# Patient Record
Sex: FEMALE | Race: ASIAN | NOT HISPANIC OR LATINO | Employment: FULL TIME | URBAN - METROPOLITAN AREA
[De-identification: names, ages, dates, MRNs, and addresses within clinical notes are randomized per-mention and may not be internally consistent; named-entity substitution may affect disease eponyms.]

---

## 2023-10-20 ENCOUNTER — TELEPHONE (OUTPATIENT)
Dept: PRIMARY CARE | Facility: CLINIC | Age: 63
End: 2023-10-20
Payer: COMMERCIAL

## 2023-10-20 DIAGNOSIS — Z00.00 WELLNESS EXAMINATION: Primary | ICD-10-CM

## 2023-12-04 ENCOUNTER — LAB (OUTPATIENT)
Dept: LAB | Facility: LAB | Age: 63
End: 2023-12-04
Payer: COMMERCIAL

## 2023-12-04 DIAGNOSIS — Z00.00 WELLNESS EXAMINATION: ICD-10-CM

## 2023-12-04 LAB
ALBUMIN SERPL BCP-MCNC: 4.7 G/DL (ref 3.4–5)
ALP SERPL-CCNC: 76 U/L (ref 33–136)
ALT SERPL W P-5'-P-CCNC: 19 U/L (ref 7–45)
ANION GAP SERPL CALC-SCNC: 14 MMOL/L (ref 10–20)
AST SERPL W P-5'-P-CCNC: 17 U/L (ref 9–39)
BASOPHILS # BLD AUTO: 0.07 X10*3/UL (ref 0–0.1)
BASOPHILS NFR BLD AUTO: 1.2 %
BILIRUB SERPL-MCNC: 0.5 MG/DL (ref 0–1.2)
BUN SERPL-MCNC: 13 MG/DL (ref 6–23)
CALCIUM SERPL-MCNC: 10 MG/DL (ref 8.6–10.6)
CHLORIDE SERPL-SCNC: 103 MMOL/L (ref 98–107)
CHOLEST SERPL-MCNC: 314 MG/DL (ref 0–199)
CHOLESTEROL/HDL RATIO: 5.4
CO2 SERPL-SCNC: 27 MMOL/L (ref 21–32)
CREAT SERPL-MCNC: 0.62 MG/DL (ref 0.5–1.05)
EOSINOPHIL # BLD AUTO: 0.15 X10*3/UL (ref 0–0.7)
EOSINOPHIL NFR BLD AUTO: 2.6 %
ERYTHROCYTE [DISTWIDTH] IN BLOOD BY AUTOMATED COUNT: 13.1 % (ref 11.5–14.5)
GFR SERPL CREATININE-BSD FRML MDRD: >90 ML/MIN/1.73M*2
GLUCOSE SERPL-MCNC: 95 MG/DL (ref 74–99)
HCT VFR BLD AUTO: 44.8 % (ref 36–46)
HDLC SERPL-MCNC: 57.9 MG/DL
HGB BLD-MCNC: 14.4 G/DL (ref 12–16)
IMM GRANULOCYTES # BLD AUTO: 0 X10*3/UL (ref 0–0.7)
IMM GRANULOCYTES NFR BLD AUTO: 0 % (ref 0–0.9)
LDLC SERPL CALC-MCNC: 203 MG/DL
LYMPHOCYTES # BLD AUTO: 1.82 X10*3/UL (ref 1.2–4.8)
LYMPHOCYTES NFR BLD AUTO: 31.1 %
MCH RBC QN AUTO: 28.9 PG (ref 26–34)
MCHC RBC AUTO-ENTMCNC: 32.1 G/DL (ref 32–36)
MCV RBC AUTO: 90 FL (ref 80–100)
MONOCYTES # BLD AUTO: 0.35 X10*3/UL (ref 0.1–1)
MONOCYTES NFR BLD AUTO: 6 %
NEUTROPHILS # BLD AUTO: 3.47 X10*3/UL (ref 1.2–7.7)
NEUTROPHILS NFR BLD AUTO: 59.1 %
NON HDL CHOLESTEROL: 256 MG/DL (ref 0–149)
NRBC BLD-RTO: 0 /100 WBCS (ref 0–0)
PLATELET # BLD AUTO: 236 X10*3/UL (ref 150–450)
POTASSIUM SERPL-SCNC: 4.2 MMOL/L (ref 3.5–5.3)
PROT SERPL-MCNC: 7.3 G/DL (ref 6.4–8.2)
RBC # BLD AUTO: 4.99 X10*6/UL (ref 4–5.2)
SODIUM SERPL-SCNC: 140 MMOL/L (ref 136–145)
TRIGL SERPL-MCNC: 268 MG/DL (ref 0–149)
TSH SERPL-ACNC: 1.57 MIU/L (ref 0.44–3.98)
VLDL: 54 MG/DL (ref 0–40)
WBC # BLD AUTO: 5.9 X10*3/UL (ref 4.4–11.3)

## 2023-12-04 PROCEDURE — 80053 COMPREHEN METABOLIC PANEL: CPT

## 2023-12-04 PROCEDURE — 36415 COLL VENOUS BLD VENIPUNCTURE: CPT

## 2023-12-04 PROCEDURE — 80061 LIPID PANEL: CPT

## 2023-12-04 PROCEDURE — 84443 ASSAY THYROID STIM HORMONE: CPT

## 2023-12-04 PROCEDURE — 85025 COMPLETE CBC W/AUTO DIFF WBC: CPT

## 2023-12-07 ENCOUNTER — OFFICE VISIT (OUTPATIENT)
Dept: PRIMARY CARE | Facility: CLINIC | Age: 63
End: 2023-12-07
Payer: COMMERCIAL

## 2023-12-07 VITALS
OXYGEN SATURATION: 95 % | HEIGHT: 67 IN | SYSTOLIC BLOOD PRESSURE: 130 MMHG | RESPIRATION RATE: 18 BRPM | WEIGHT: 181.8 LBS | TEMPERATURE: 98.2 F | DIASTOLIC BLOOD PRESSURE: 78 MMHG | BODY MASS INDEX: 28.53 KG/M2 | HEART RATE: 88 BPM

## 2023-12-07 DIAGNOSIS — Z00.00 WELLNESS EXAMINATION: Primary | ICD-10-CM

## 2023-12-07 DIAGNOSIS — E78.00 PURE HYPERCHOLESTEROLEMIA: ICD-10-CM

## 2023-12-07 DIAGNOSIS — Z12.11 ENCOUNTER FOR SCREENING COLONOSCOPY: ICD-10-CM

## 2023-12-07 DIAGNOSIS — Z12.31 SCREENING MAMMOGRAM FOR BREAST CANCER: ICD-10-CM

## 2023-12-07 PROCEDURE — 99396 PREV VISIT EST AGE 40-64: CPT | Performed by: FAMILY MEDICINE

## 2023-12-07 PROCEDURE — 1036F TOBACCO NON-USER: CPT | Performed by: FAMILY MEDICINE

## 2023-12-07 RX ORDER — MULTIVITAMIN
TABLET ORAL
COMMUNITY

## 2023-12-07 RX ORDER — ROSUVASTATIN CALCIUM 5 MG/1
5 TABLET, COATED ORAL DAILY
Qty: 90 TABLET | Refills: 0 | Status: SHIPPED | OUTPATIENT
Start: 2023-12-07 | End: 2023-12-19 | Stop reason: WASHOUT

## 2023-12-07 ASSESSMENT — ENCOUNTER SYMPTOMS
LOSS OF SENSATION IN FEET: 0
DEPRESSION: 0
OCCASIONAL FEELINGS OF UNSTEADINESS: 0

## 2023-12-07 ASSESSMENT — COLUMBIA-SUICIDE SEVERITY RATING SCALE - C-SSRS
1. IN THE PAST MONTH, HAVE YOU WISHED YOU WERE DEAD OR WISHED YOU COULD GO TO SLEEP AND NOT WAKE UP?: NO
6. HAVE YOU EVER DONE ANYTHING, STARTED TO DO ANYTHING, OR PREPARED TO DO ANYTHING TO END YOUR LIFE?: NO
2. HAVE YOU ACTUALLY HAD ANY THOUGHTS OF KILLING YOURSELF?: NO

## 2023-12-07 ASSESSMENT — PATIENT HEALTH QUESTIONNAIRE - PHQ9
SUM OF ALL RESPONSES TO PHQ9 QUESTIONS 1 AND 2: 0
1. LITTLE INTEREST OR PLEASURE IN DOING THINGS: NOT AT ALL
2. FEELING DOWN, DEPRESSED OR HOPELESS: NOT AT ALL

## 2023-12-07 NOTE — PROGRESS NOTES
"Subjective   Patient ID: Nargis Gill is a 63 y.o. female who presents for Annual Exam.    HPI   Pt lipids are up  Review of Systems   All other systems reviewed and are negative.  Pt UTD vaccines  Needs colonoscopy and mammogram      Objective   /78 (BP Location: Right arm, Patient Position: Sitting, BP Cuff Size: Adult)   Pulse 88   Temp 36.8 °C (98.2 °F)   Resp 18   Ht 1.702 m (5' 7\")   Wt 82.5 kg (181 lb 12.8 oz)   SpO2 95%   BMI 28.47 kg/m²     Physical Exam  Constitutional:       Appearance: Normal appearance.   HENT:      Head: Normocephalic and atraumatic.      Right Ear: Tympanic membrane, ear canal and external ear normal.      Left Ear: Tympanic membrane, ear canal and external ear normal.      Nose: Nose normal.      Mouth/Throat:      Mouth: Mucous membranes are moist.      Pharynx: Oropharynx is clear.   Eyes:      Extraocular Movements: Extraocular movements intact.      Conjunctiva/sclera: Conjunctivae normal.      Pupils: Pupils are equal, round, and reactive to light.   Cardiovascular:      Rate and Rhythm: Normal rate and regular rhythm.      Pulses: Normal pulses.      Heart sounds: Normal heart sounds.   Pulmonary:      Effort: Pulmonary effort is normal.      Breath sounds: Normal breath sounds.   Abdominal:      General: Abdomen is flat. Bowel sounds are normal.      Palpations: Abdomen is soft.   Genitourinary:     Comments: nl  Musculoskeletal:         General: Normal range of motion.      Cervical back: Normal range of motion and neck supple.   Skin:     General: Skin is warm and dry.      Capillary Refill: Capillary refill takes less than 2 seconds.   Neurological:      General: No focal deficit present.      Mental Status: She is alert and oriented to person, place, and time.   Psychiatric:         Mood and Affect: Mood normal.         Behavior: Behavior normal.         Thought Content: Thought content normal.         Assessment/Plan   Diagnoses and all orders for this " visit:  Wellness examination  Screening mammogram for breast cancer  -     BI mammo bilateral screening tomosynthesis; Future  Encounter for screening colonoscopy  -     Colonoscopy Screening; Average Risk Patient; Future  Pure hypercholesterolemia  -     CT cardiac scoring wo IV contrast; Future  -     Lipoprotein NMR; Future  -     Lipid Panel; Future  -     Comprehensive Metabolic Panel; Future  -     rosuvastatin (Crestor) 5 mg tablet; Take 1 tablet (5 mg) by mouth once daily.    Rto 3m

## 2023-12-08 DIAGNOSIS — Z12.11 COLON CANCER SCREENING: ICD-10-CM

## 2023-12-08 RX ORDER — POLYETHYLENE GLYCOL 3350, SODIUM SULFATE ANHYDROUS, SODIUM BICARBONATE, SODIUM CHLORIDE, POTASSIUM CHLORIDE 236; 22.74; 6.74; 5.86; 2.97 G/4L; G/4L; G/4L; G/4L; G/4L
4000 POWDER, FOR SOLUTION ORAL ONCE
Qty: 4000 ML | Refills: 0 | Status: SHIPPED | OUTPATIENT
Start: 2023-12-08 | End: 2023-12-08

## 2023-12-09 ENCOUNTER — ANCILLARY PROCEDURE (OUTPATIENT)
Dept: RADIOLOGY | Facility: CLINIC | Age: 63
End: 2023-12-09
Payer: COMMERCIAL

## 2023-12-09 DIAGNOSIS — E78.00 PURE HYPERCHOLESTEROLEMIA: ICD-10-CM

## 2023-12-09 PROCEDURE — 75571 CT HRT W/O DYE W/CA TEST: CPT

## 2023-12-14 ENCOUNTER — OFFICE VISIT (OUTPATIENT)
Dept: OBSTETRICS AND GYNECOLOGY | Facility: CLINIC | Age: 63
End: 2023-12-14
Payer: COMMERCIAL

## 2023-12-14 ENCOUNTER — APPOINTMENT (OUTPATIENT)
Dept: OBSTETRICS AND GYNECOLOGY | Facility: CLINIC | Age: 63
End: 2023-12-14
Payer: COMMERCIAL

## 2023-12-14 VITALS
SYSTOLIC BLOOD PRESSURE: 122 MMHG | DIASTOLIC BLOOD PRESSURE: 80 MMHG | HEIGHT: 66 IN | WEIGHT: 178 LBS | BODY MASS INDEX: 28.61 KG/M2

## 2023-12-14 DIAGNOSIS — Z78.0 ASYMPTOMATIC POSTMENOPAUSAL STATE: ICD-10-CM

## 2023-12-14 DIAGNOSIS — Z01.419 WELL WOMAN EXAM WITH ROUTINE GYNECOLOGICAL EXAM: ICD-10-CM

## 2023-12-14 DIAGNOSIS — Z12.4 ROUTINE CERVICAL SMEAR: ICD-10-CM

## 2023-12-14 DIAGNOSIS — Z13.820 SCREENING FOR OSTEOPOROSIS: Primary | ICD-10-CM

## 2023-12-14 PROCEDURE — 88141 CYTOPATH C/V INTERPRET: CPT | Performed by: PATHOLOGY

## 2023-12-14 PROCEDURE — 88175 CYTOPATH C/V AUTO FLUID REDO: CPT

## 2023-12-14 PROCEDURE — 1036F TOBACCO NON-USER: CPT | Performed by: OBSTETRICS & GYNECOLOGY

## 2023-12-14 PROCEDURE — 87624 HPV HI-RISK TYP POOLED RSLT: CPT

## 2023-12-14 PROCEDURE — 99396 PREV VISIT EST AGE 40-64: CPT | Performed by: OBSTETRICS & GYNECOLOGY

## 2023-12-14 NOTE — PROGRESS NOTES
Subjective   Patient ID: Nargis Gill is a 63 y.o. female who presents for Annual Exam.  PAP 12-12-19 NEG, 12-8-17 NEG HPV NEG, 6-18-15 NEG, 3-21-14 severe dysplasia/CIS, PAP 12-16-14 NEG HPV NEG  COLPO 6-19-14 ARTIS 2 to 3  LEEP 8-29-14 NEG  MAMMO 9-16-21 has tomorrow.  DEXA 9-16-21 No FH.  COLON 12-2-13 one small polyp, 10 years. Has scheduled for January 12.     Has 4 grandkids. Kids doing ER.8    Elevated cholesterol and calcium score. Seeing a cardiologist. Started on Crestor. Broke wrist this fall.     No VB. Weight gain.     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     Dad had tripple bypass. Had a tibial plateu fx this summer. Had a DEXA. Taking vit D.     Review of Systems   All other systems reviewed and are negative.      Objective   Constitutional: Alert and in no acute distress. Well developed, well nourished.  Neck: no neck asymmetry. Supple and thyroid not enlarged and there were no palpable thyroid nodules.  Chest: Breasts normal appearance, no nipple discharge and no skin changes and palpation of breasts and axillae: no palpable mass and no axillary lymphadenopathy.  Abdomen: soft nontender; no abdominal mass palpated, no organomegaly and no hernias.  Genitourinary: external genitalia: normal, no inguinal lymphadenopathy, Bartholin's urethral and Wood-Ridge's glands: normal, urethra: normal and bladder: normal on palpation.  Vagina: normal.  Cervix: normal.  Uterus: normal.   Right adnexa/parametria: normal.  Left adnexa/parametria: normal.  Skin: normal skin color and pigmentation, normal skin turgor and no rash.  Psychiatric: alert and oriented x 3, affect normal to patient baseline and mood appropriate.     Assessment/Plan   -Pap-HPV  -has saul order  -DEXA  -Has colonoscopy scheduled.

## 2023-12-15 ENCOUNTER — ANCILLARY PROCEDURE (OUTPATIENT)
Dept: RADIOLOGY | Facility: CLINIC | Age: 63
End: 2023-12-15
Payer: COMMERCIAL

## 2023-12-15 DIAGNOSIS — Z12.31 SCREENING MAMMOGRAM FOR BREAST CANCER: ICD-10-CM

## 2023-12-15 PROCEDURE — 77067 SCR MAMMO BI INCL CAD: CPT | Performed by: RADIOLOGY

## 2023-12-15 PROCEDURE — 77067 SCR MAMMO BI INCL CAD: CPT

## 2023-12-15 PROCEDURE — 77063 BREAST TOMOSYNTHESIS BI: CPT | Performed by: RADIOLOGY

## 2023-12-18 ENCOUNTER — ANCILLARY PROCEDURE (OUTPATIENT)
Dept: RADIOLOGY | Facility: CLINIC | Age: 63
End: 2023-12-18
Payer: COMMERCIAL

## 2023-12-18 DIAGNOSIS — Z78.0 ASYMPTOMATIC POSTMENOPAUSAL STATE: ICD-10-CM

## 2023-12-18 DIAGNOSIS — Z13.820 SCREENING FOR OSTEOPOROSIS: ICD-10-CM

## 2023-12-18 PROCEDURE — 77080 DXA BONE DENSITY AXIAL: CPT

## 2023-12-18 PROCEDURE — 77080 DXA BONE DENSITY AXIAL: CPT | Performed by: RADIOLOGY

## 2023-12-19 ENCOUNTER — OFFICE VISIT (OUTPATIENT)
Dept: CARDIOLOGY | Facility: CLINIC | Age: 63
End: 2023-12-19
Payer: COMMERCIAL

## 2023-12-19 VITALS
SYSTOLIC BLOOD PRESSURE: 153 MMHG | DIASTOLIC BLOOD PRESSURE: 97 MMHG | BODY MASS INDEX: 27.78 KG/M2 | HEIGHT: 67 IN | HEART RATE: 76 BPM | WEIGHT: 177 LBS | TEMPERATURE: 97.6 F

## 2023-12-19 DIAGNOSIS — E78.01 FAMILIAL HYPERCHOLESTEREMIA: ICD-10-CM

## 2023-12-19 DIAGNOSIS — R93.1 AGATSTON CORONARY ARTERY CALCIUM SCORE GREATER THAN 400: ICD-10-CM

## 2023-12-19 DIAGNOSIS — I25.10 CORONARY ARTERY DISEASE INVOLVING NATIVE CORONARY ARTERY OF NATIVE HEART WITHOUT ANGINA PECTORIS: Primary | ICD-10-CM

## 2023-12-19 PROCEDURE — 99214 OFFICE O/P EST MOD 30 MIN: CPT | Performed by: INTERNAL MEDICINE

## 2023-12-19 PROCEDURE — 99204 OFFICE O/P NEW MOD 45 MIN: CPT | Performed by: INTERNAL MEDICINE

## 2023-12-19 PROCEDURE — 1036F TOBACCO NON-USER: CPT | Performed by: INTERNAL MEDICINE

## 2023-12-19 RX ORDER — NAPROXEN SODIUM 220 MG/1
81 TABLET, FILM COATED ORAL DAILY
Qty: 30 TABLET | Refills: 11 | Status: SHIPPED | OUTPATIENT
Start: 2023-12-19 | End: 2024-12-18

## 2023-12-19 RX ORDER — UBIDECARENONE 30 MG
30 CAPSULE ORAL DAILY
COMMUNITY

## 2023-12-19 RX ORDER — ROSUVASTATIN CALCIUM 20 MG/1
20 TABLET, COATED ORAL DAILY
Qty: 30 TABLET | Refills: 11 | Status: SHIPPED | OUTPATIENT
Start: 2023-12-19 | End: 2024-12-18

## 2023-12-19 NOTE — PROGRESS NOTES
Chief Complaint:   Coronary Artery Disease     History of Present Illness     Nargis Gill is a 63 y.o. female presenting with for evaluation of preclinical coronary artery disease detected by coronary artery calcium scoring.   The patient follows a heart healthy diet.  The patient  is not having any anginal symptoms or dyspnea on exertion.  Hx of severely elevated LDLC.    Review of Systems  All pertinent systems have been reviewed and are negative except for what is stated in the history of present illness.    All other systems have been reviewed and are negative and noncontributory to this patient's current ailments.   .       Previous History     Past Medical History:  She has a past medical history of Agatston coronary artery calcium score greater than 400 (12/19/2023), Carcinoma in situ of cervix, unspecified, Coronary artery disease involving native coronary artery of native heart without angina pectoris (12/19/2023), Familial hypercholesteremia (12/19/2023), Moderate cervical dysplasia (12/16/2014), Other specified noninflammatory disorders of vagina, Personal history of other diseases of the circulatory system, Personal history of other diseases of the female genital tract, Personal history of other diseases of the female genital tract, and Personal history of other infectious and parasitic diseases.    Past Surgical History:  She has a past surgical history that includes Cataract extraction (03/25/2014); Other surgical history (10/14/2021); Other surgical history (10/14/2021); and Other surgical history (05/08/2014).      Social History:  She reports that she has never smoked. She has never used smokeless tobacco. She reports that she does not drink alcohol and does not use drugs.    Family History:  Family History   Problem Relation Name Age of Onset    Kidney cancer Mother      Other (ANXIETY) Father      Heart disease Father          Allergies:  Patient has no known allergies.    Outpatient  "Medications:  Current Outpatient Medications   Medication Instructions    co-enzyme Q-10 30 mg, oral, Daily    multivitamin tablet oral    rosuvastatin (CRESTOR) 5 mg, oral, Daily       Physical Examination   Vitals:  Visit Vitals  BP (!) 153/97 (BP Location: Right arm)   Pulse 76   Temp 36.4 °C (97.6 °F)   Ht 1.702 m (5' 7\")   Wt 80.3 kg (177 lb)   LMP  (LMP Unknown)   BMI 27.72 kg/m²   OB Status Postmenopausal   Smoking Status Never   BSA 1.95 m²    Physical Exam  Vitals reviewed.   Constitutional:       General: She is not in acute distress.     Appearance: Normal appearance.   HENT:      Head: Normocephalic and atraumatic.      Nose: Nose normal.   Eyes:      Conjunctiva/sclera: Conjunctivae normal.   Cardiovascular:      Rate and Rhythm: Normal rate and regular rhythm.      Pulses: Normal pulses.      Heart sounds: No murmur heard.  Pulmonary:      Effort: Pulmonary effort is normal. No respiratory distress.      Breath sounds: Normal breath sounds. No wheezing, rhonchi or rales.   Abdominal:      General: Bowel sounds are normal. There is no distension.      Palpations: Abdomen is soft.      Tenderness: There is no abdominal tenderness.   Musculoskeletal:         General: No swelling.      Right lower leg: No edema.      Left lower leg: No edema.   Skin:     General: Skin is warm and dry.      Capillary Refill: Capillary refill takes less than 2 seconds.   Neurological:      General: No focal deficit present.      Mental Status: She is alert.   Psychiatric:         Mood and Affect: Mood normal.              Labs/Imaging/Cardiac Studies     Last Labs:  CBC -  Lab Results   Component Value Date    WBC 5.9 12/04/2023    HGB 14.4 12/04/2023    HCT 44.8 12/04/2023    MCV 90 12/04/2023     12/04/2023       CMP -  Lab Results   Component Value Date    CALCIUM 10.0 12/04/2023    PROT 7.3 12/04/2023    ALBUMIN 4.7 12/04/2023    AST 17 12/04/2023    ALT 19 12/04/2023    ALKPHOS 76 12/04/2023    BILITOT 0.5 " 12/04/2023       LIPID PANEL -   Lab Results   Component Value Date    CHOL 314 (H) 12/04/2023    HDL 57.9 12/04/2023    CHHDL 5.4 12/04/2023    VLDL 54 (H) 12/04/2023    TRIG 268 (H) 12/04/2023    NHDL 256 (H) 12/04/2023       RENAL FUNCTION PANEL -   Lab Results   Component Value Date    K 4.2 12/04/2023       Lab Results   Component Value Date    HGBA1C 6.0 (A) 10/30/2021       ECG:    Echo:  No echocardiogram results found for the past 12 months   Interpreted By:  Bridger Crockett,   STUDY:  CT CARDIAC SCORING WO IV CONTRAST;  12/9/2023 11:41 am      INDICATION:  Signs/Symptoms:hld.      COMPARISON:  None.      ACCESSION NUMBER(S):  [id number]      ORDERING CLINICIAN:  MALDONADO NICOLAS      TECHNIQUE:  Using prospective ECG gating, CT scan of the coronary arteries was  performed without intravenous contrast. Coronary calcium scoring  was  performed according to the method of Agatston.      CT Dose-Length Product (DLP): 65.1 mGy*cm  CT Dose Reduction Employed: Yes, prospective gating, iterative  reconstruction.      FINDINGS:  The score and distribution of calcium in the coronary arteries is as  follows:      LM 0    LCx 0        Total 721      The visualized ascending thoracic aorta measures 3.6 cm in diameter.  The heart is normal in size. No pericardial effusion is present.          The main pulmonary artery, right and left pulmonary artery are normal  in size.          IMPRESSION:  1. Coronary artery calcium score of 721*.  2. CHAO 98th percentile** for age, gender, and race in asymptomatic  patients.    Assessment and Recommendations     Assessment/Plan     1. Coronary artery disease involving native coronary artery of native heart without angina pectoris  CAD: The patient's CAD, as detailed in the HPI, has been clinically stable, without any anginal symptoms or dyspnea.  The patient will start treatment with guideline-directed medical therapy with antiplatelet and statin medications and will  start regular exercise and a heart healthy diet.     - Echocardiogram Stress Test; Future    2. Agatston coronary artery calcium score greater than 400  As above  - Echocardiogram Stress Test; Future    3. Familial hypercholesteremia  She has heterozygous FH with LDL>190 - will start Crestor 20 mg every day.   - Echocardiogram Stress Test; Future       Johny Cifuentes MD    Exclusive of any other services or procedures performed, IJohny MD , spent 30 minutes in duration for this visit today.  This time consisted of chart review, obtaining history, and/or performing the exam as documented above as well as documenting the clinical information for the encounter in the electronic record, discussing treatment options, plans, and/or goals with patient, family, and/or caregiver, refilling medications, updating the electronic record, ordering medicines, lab work, imaging, referrals, and/or procedures as documented above and communicating with other [facility] professionals. I have discussed the results of laboratory, radiology, and cardiology studies with the patient and their family/caregiver.

## 2023-12-20 ENCOUNTER — OFFICE VISIT (OUTPATIENT)
Dept: DERMATOLOGY | Facility: CLINIC | Age: 63
End: 2023-12-20
Payer: COMMERCIAL

## 2023-12-20 DIAGNOSIS — L40.9 PSORIASIS: Primary | ICD-10-CM

## 2023-12-20 PROCEDURE — 1036F TOBACCO NON-USER: CPT | Performed by: DERMATOLOGY

## 2023-12-20 PROCEDURE — 99204 OFFICE O/P NEW MOD 45 MIN: CPT | Performed by: DERMATOLOGY

## 2023-12-20 RX ORDER — CLOBETASOL PROPIONATE 0.5 MG/G
CREAM TOPICAL
Qty: 60 G | Refills: 3 | Status: SHIPPED | OUTPATIENT
Start: 2023-12-20 | End: 2024-02-28 | Stop reason: WASHOUT

## 2023-12-20 ASSESSMENT — DERMATOLOGY QUALITY OF LIFE (QOL) ASSESSMENT
RATE HOW BOTHERED YOU ARE BY SYMPTOMS OF YOUR SKIN PROBLEM (EG, ITCHING, STINGING BURNING, HURTING OR SKIN IRRITATION): 0 - NEVER BOTHERED
RATE HOW EMOTIONALLY BOTHERED YOU ARE BY YOUR SKIN PROBLEM (FOR EXAMPLE, WORRY, EMBARRASSMENT, FRUSTRATION): 1
RATE HOW BOTHERED YOU ARE BY EFFECTS OF YOUR SKIN PROBLEMS ON YOUR ACTIVITIES (EG, GOING OUT, ACCOMPLISHING WHAT YOU WANT, WORK ACTIVITIES OR YOUR RELATIONSHIPS WITH OTHERS): 1
DATE THE QUALITY-OF-LIFE ASSESSMENT WAS COMPLETED: 66828
WHAT SINGLE SKIN CONDITION LISTED BELOW IS THE PATIENT ANSWERING THE QUALITY-OF-LIFE ASSESSMENT QUESTIONS ABOUT: PSORIASIS
RATE HOW EMOTIONALLY BOTHERED YOU ARE BY YOUR SKIN PROBLEM (FOR EXAMPLE, WORRY, EMBARRASSMENT, FRUSTRATION): 2
RATE HOW BOTHERED YOU ARE BY EFFECTS OF YOUR SKIN PROBLEMS ON YOUR ACTIVITIES (EG, GOING OUT, ACCOMPLISHING WHAT YOU WANT, WORK ACTIVITIES OR YOUR RELATIONSHIPS WITH OTHERS): 1
RATE HOW BOTHERED YOU ARE BY EFFECTS OF YOUR SKIN PROBLEMS ON YOUR ACTIVITIES (EG, GOING OUT, ACCOMPLISHING WHAT YOU WANT, WORK ACTIVITIES OR YOUR RELATIONSHIPS WITH OTHERS): 1
RATE HOW BOTHERED YOU ARE BY SYMPTOMS OF YOUR SKIN PROBLEM (EG, ITCHING, STINGING BURNING, HURTING OR SKIN IRRITATION): 0 - NEVER BOTHERED
WHAT SINGLE SKIN CONDITION LISTED BELOW IS THE PATIENT ANSWERING THE QUALITY-OF-LIFE ASSESSMENT QUESTIONS ABOUT: PSORIASIS
WHAT SINGLE SKIN CONDITION LISTED BELOW IS THE PATIENT ANSWERING THE QUALITY-OF-LIFE ASSESSMENT QUESTIONS ABOUT: PSORIASIS
ARE THERE EXCLUSIONS OR EXCEPTIONS FOR THE QUALITY OF LIFE ASSESSMENT: NO
RATE HOW EMOTIONALLY BOTHERED YOU ARE BY YOUR SKIN PROBLEM (FOR EXAMPLE, WORRY, EMBARRASSMENT, FRUSTRATION): 1
RATE HOW BOTHERED YOU ARE BY SYMPTOMS OF YOUR SKIN PROBLEM (EG, ITCHING, STINGING BURNING, HURTING OR SKIN IRRITATION): 0 - NEVER BOTHERED

## 2023-12-20 ASSESSMENT — DERMATOLOGY PATIENT ASSESSMENT
ARE YOU ON BIRTH CONTROL: NO
HAVE YOU HAD OR DO YOU HAVE VASCULAR DISEASE: NO
DO YOU HAVE IRREGULAR MENSTRUAL CYCLES: NO
DO YOU USE A TANNING BED: YES, CURRENTLY
ARE YOU TRYING TO GET PREGNANT: NO
HAVE YOU HAD OR DO YOU HAVE A STAPH INFECTION: NO
DO YOU HAVE ANY NEW OR CHANGING LESIONS: NO
DO YOU USE SUNSCREEN: OCCASIONALLY
ARE YOU AN ORGAN TRANSPLANT RECIPIENT: NO

## 2023-12-20 ASSESSMENT — PATIENT GLOBAL ASSESSMENT (PGA)
PATIENT GLOBAL ASSESSMENT: PATIENT GLOBAL ASSESSMENT:  1 - CLEAR
WHAT IS THE PGA: PATIENT GLOBAL ASSESSMENT:  1 - CLEAR

## 2023-12-20 ASSESSMENT — ITCH NUMERIC RATING SCALE: HOW SEVERE IS YOUR ITCHING?: 0

## 2023-12-20 NOTE — PROGRESS NOTES
Subjective     Nargis Gill is a 63 y.o. female who presents for the following: Psoriasis (Reports flare after remission of 30 years.). She states she has noticed new areas in the last ten years. She has recently used hydrocortisone 1% and fluocinonide ointment for treatment, but admits very sparingly. She would like to discuss treatment options.     Review of Systems:  No other skin or systemic complaints other than what is documented elsewhere in the note.    The following portions of the chart were reviewed this encounter and updated as appropriate:  Meds       Specialty Problems    None    Past Medical History:  Nargis Gill  has a past medical history of Agatston coronary artery calcium score greater than 400 (12/19/2023), Carcinoma in situ of cervix, unspecified, Coronary artery disease involving native coronary artery of native heart without angina pectoris (12/19/2023), Familial hypercholesteremia (12/19/2023), Moderate cervical dysplasia (12/16/2014), Other specified noninflammatory disorders of vagina, Personal history of other diseases of the circulatory system, Personal history of other diseases of the female genital tract, Personal history of other diseases of the female genital tract, and Personal history of other infectious and parasitic diseases.    Past Surgical History:  Nargis Gill  has a past surgical history that includes Cataract extraction (03/25/2014); Other surgical history (10/14/2021); Other surgical history (10/14/2021); and Other surgical history (05/08/2014).    Family History:  Patient family history includes ANXIETY in her father; Heart disease in her father; Kidney cancer in her mother.    Social History:  Nargis Gill  reports that she has never smoked. She has never used smokeless tobacco. She reports that she does not drink alcohol and does not use drugs.    Allergies:  Patient has no known allergies.    Current Medications / CAM's:    Current Outpatient Medications:      aspirin 81 mg chewable tablet, Chew 1 tablet (81 mg) once daily., Disp: 30 tablet, Rfl: 11    clobetasol (Temovate) 0.05 % cream, Apply twice a day for 4 weeks, Disp: 60 g, Rfl: 3    co-enzyme Q-10 30 mg capsule, Take 1 capsule (30 mg) by mouth once daily., Disp: , Rfl:     multivitamin tablet, Take by mouth., Disp: , Rfl:     rosuvastatin (Crestor) 20 mg tablet, Take 1 tablet (20 mg) by mouth once daily., Disp: 30 tablet, Rfl: 11     Objective   Well appearing patient in no apparent distress; mood and affect are within normal limits.    A partial examination  was performed including scalp, head, eyes, ears, nose, lips, neck, chest,  back, buttocks, bilateral lower extremities, hands, fingers, toes, fingernails, and toenails. Nail polish precluded full examination of fingernails and toenails. All findings within normal limits unless otherwise noted below.    Erythematous plaques with micaceous scale on right knee, bilateral buttocks ; smaller droplet size papules on the bialteral thighs          Assessment/Plan   Psoriasis    Psoriasis Vulgaris, <3% BSA   -Denies any increase in morning stiffness, joint swelling. Denies noticeable nail involvement (nails all polished today).   -Discussed in detail options for treatment: topicals vs systemic  -Discussed systemic treatment not necessary for BSA at this time without concern for nail or joint involvement. Discussed with patient if she notices any signs of psoriatic arthritis, will revisit treating systemically or with worsening skin involvement./  -Will start Clobetasol 0.05% cream. Instructed to apply steroid twice a day for 3-4 weeks, then can wean down application as tolerated. Discussed risk of atrophy/telangiectasias with topical steroid use.   -Discussed increased risk of heart disease with psoriasis. Encouraged continuing regular checks with her primary care physician. She notes she is uptodate.       clobetasol (Temovate) 0.05 % cream  Apply twice a day for 4  weeks    Related Procedures  Follow Up In Dermatology - Established Patient       RTC in 3 months     Joyce Collins DO   Dermatology Resident, PGY-2     I saw and evaluated the patient. I personally obtained the key and critical portions of the history and physical exam or was physically present for key and critical portions performed by the resident/fellow. I reviewed the resident/fellow's documentation and discussed the patient with the resident/fellow. I agree with the resident/fellow's medical decision making as documented in the note.    Trina Ridley MD

## 2024-01-04 LAB
CYTOLOGY CMNT CVX/VAG CYTO-IMP: NORMAL
HPV HR 12 DNA GENITAL QL NAA+PROBE: NEGATIVE
HPV HR GENOTYPES PNL CVX NAA+PROBE: NEGATIVE
HPV16 DNA SPEC QL NAA+PROBE: NEGATIVE
HPV18 DNA SPEC QL NAA+PROBE: NEGATIVE
LAB AP HPV GENOTYPE QUESTION: YES
LAB AP HPV HR: NORMAL
LABORATORY COMMENT REPORT: NORMAL
PATH REPORT.TOTAL CANCER: NORMAL

## 2024-01-11 NOTE — PROGRESS NOTES
Patient ID: Nargis Gill is a 64 y.o. female.    Here for colpo and EMB.       1Pap 2/14/24  High grade squamous intraepithelial lesion (HSIL) - Cervix  Atypical glandular cells, HPV negative.  No VB.      PAP 10/14/21 neg/neg  PAP 12-12-19 NEG, 12-8-17 NEG HPV NEG, 6-18-15 NEG, 3-21-14 severe dysplasia/CIS, PAP 12-16-14 NEG HPV NEG  COLPO 6-19-14 ARTIS 2 to 3  LEEP 8-29-14 NEG    Needs colonoscopy and ECHO.    Colposcopy    Date/Time: 1/12/2024 5:55 PM    Performed by: Mona Pierce MD  Authorized by: Mona Pierce MD    Procedure location: cervix    Consent:     Consent obtained:  Written    Consent given by:  Patient  Indication:     Cervical indication(s): AGC and cervical HSIL    Pre-procedure:     Prep solution(s): acetic acid      Local anesthetic:  Lidocaine 1% w/o epi  Procedure:     Colposcopy with: cervical biopsy and endocervical curettage      Cervix visibility: fully visualized      Acetowhite lesion(s): cervix    Post-procedure:     Patient tolerance of procedure:  Patient tolerated the procedure well with no immediate complications    Estimated blood loss (mL):  1  Comments:      Hemostasis with silver nitrate and monsels.  ECC and biopsies at 3, 6, and 1 o'clock.  Endometrial biopsy    Date/Time: 1/12/2024 5:58 PM    Performed by: Mona Pierce MD  Authorized by: Mona Pierce MD    Consent:     Consent obtained: written    Consent given by: patient    Risks discussed: bleeding and infection  Indications:     Indications: MARIANNE Pap    Procedure:     Prepped with: Betadine    Tenaculum used: yes      A local block was performed: yes      Local anesthetic: lidocaine 1% w/o epi    Cervix dilated: no    Findings:     Cervix: normal      Uterus depth by sound (cm): 7    Specimen collected: low volume sample collected      Patient tolerance: tolerated well, no immediate complications  Comments:     Procedure comments: Scant tissue with 3 aggressive passes.  Physical Exam  Genitourinary:           ECC,  Biopsies at 3:00, 6:00, 1:00

## 2024-01-12 ENCOUNTER — APPOINTMENT (OUTPATIENT)
Dept: GASTROENTEROLOGY | Facility: EXTERNAL LOCATION | Age: 64
End: 2024-01-12
Payer: COMMERCIAL

## 2024-01-12 ENCOUNTER — PROCEDURE VISIT (OUTPATIENT)
Dept: OBSTETRICS AND GYNECOLOGY | Facility: CLINIC | Age: 64
End: 2024-01-12
Payer: COMMERCIAL

## 2024-01-12 VITALS — WEIGHT: 176 LBS | DIASTOLIC BLOOD PRESSURE: 86 MMHG | SYSTOLIC BLOOD PRESSURE: 130 MMHG | BODY MASS INDEX: 27.57 KG/M2

## 2024-01-12 DIAGNOSIS — R87.619 ATYPICAL GLANDULAR CELLS OF UNDETERMINED SIGNIFICANCE (AGUS) ON CERVICAL PAP SMEAR: ICD-10-CM

## 2024-01-12 DIAGNOSIS — R87.613 HGSIL (HIGH GRADE SQUAMOUS INTRAEPITHELIAL LESION) ON PAP SMEAR OF CERVIX: ICD-10-CM

## 2024-01-12 PROCEDURE — 58110 BX DONE W/COLPOSCOPY ADD-ON: CPT | Performed by: OBSTETRICS & GYNECOLOGY

## 2024-01-12 PROCEDURE — 88305 TISSUE EXAM BY PATHOLOGIST: CPT | Performed by: PATHOLOGY

## 2024-01-12 PROCEDURE — 57454 BX/CURETT OF CERVIX W/SCOPE: CPT | Performed by: OBSTETRICS & GYNECOLOGY

## 2024-01-12 PROCEDURE — 88305 TISSUE EXAM BY PATHOLOGIST: CPT

## 2024-01-17 LAB
LABORATORY COMMENT REPORT: NORMAL
PATH REPORT.FINAL DX SPEC: NORMAL
PATH REPORT.GROSS SPEC: NORMAL
PATH REPORT.RELEVANT HX SPEC: NORMAL
PATH REPORT.TOTAL CANCER: NORMAL

## 2024-01-19 ENCOUNTER — TELEPHONE (OUTPATIENT)
Dept: OBSTETRICS AND GYNECOLOGY | Facility: CLINIC | Age: 64
End: 2024-01-19

## 2024-01-19 ENCOUNTER — LAB (OUTPATIENT)
Dept: LAB | Facility: LAB | Age: 64
End: 2024-01-19
Payer: COMMERCIAL

## 2024-01-19 DIAGNOSIS — I25.10 CORONARY ARTERY DISEASE INVOLVING NATIVE CORONARY ARTERY OF NATIVE HEART WITHOUT ANGINA PECTORIS: ICD-10-CM

## 2024-01-19 DIAGNOSIS — E78.00 PURE HYPERCHOLESTEROLEMIA: ICD-10-CM

## 2024-01-19 DIAGNOSIS — E78.01 FAMILIAL HYPERCHOLESTEREMIA: ICD-10-CM

## 2024-01-19 DIAGNOSIS — R93.1 AGATSTON CORONARY ARTERY CALCIUM SCORE GREATER THAN 400: ICD-10-CM

## 2024-01-19 LAB
ALBUMIN SERPL BCP-MCNC: 4.6 G/DL (ref 3.4–5)
ALP SERPL-CCNC: 70 U/L (ref 33–136)
ALT SERPL W P-5'-P-CCNC: 23 U/L (ref 7–45)
ANION GAP SERPL CALC-SCNC: 11 MMOL/L (ref 10–20)
AST SERPL W P-5'-P-CCNC: 21 U/L (ref 9–39)
BILIRUB DIRECT SERPL-MCNC: 0.1 MG/DL (ref 0–0.3)
BILIRUB SERPL-MCNC: 0.5 MG/DL (ref 0–1.2)
BUN SERPL-MCNC: 22 MG/DL (ref 6–23)
CALCIUM SERPL-MCNC: 10 MG/DL (ref 8.6–10.6)
CHLORIDE SERPL-SCNC: 103 MMOL/L (ref 98–107)
CO2 SERPL-SCNC: 29 MMOL/L (ref 21–32)
CREAT SERPL-MCNC: 0.75 MG/DL (ref 0.5–1.05)
EGFRCR SERPLBLD CKD-EPI 2021: 90 ML/MIN/1.73M*2
GLUCOSE SERPL-MCNC: 99 MG/DL (ref 74–99)
POTASSIUM SERPL-SCNC: 4.2 MMOL/L (ref 3.5–5.3)
PROT SERPL-MCNC: 7.3 G/DL (ref 6.4–8.2)
SODIUM SERPL-SCNC: 139 MMOL/L (ref 136–145)

## 2024-01-19 PROCEDURE — 80053 COMPREHEN METABOLIC PANEL: CPT

## 2024-01-19 PROCEDURE — 36415 COLL VENOUS BLD VENIPUNCTURE: CPT

## 2024-01-19 PROCEDURE — 83704 LIPOPROTEIN BLD QUAN PART: CPT

## 2024-01-19 PROCEDURE — 82248 BILIRUBIN DIRECT: CPT

## 2024-01-19 NOTE — TELEPHONE ENCOUNTER
Patient called and discussed colpo results. Refer to Dr. Kim. Brittnee notified and will contact patient.

## 2024-01-22 LAB
CHOLEST SERPL-MCNC: 169 MG/DL (ref 100–199)
HDL SERPL-SCNC: 35 UMOL/L
HDLC SERPL-MCNC: 54 MG/DL
LDL SERPL QN: 20.9 NM
LDL SERPL-SCNC: 1514 NMOL/L
LDL SMALL SERPL-SCNC: 839 NMOL/L
LDLC SERPL CALC-MCNC: 97 MG/DL (ref 0–99)
LP-IR SCORE SERPL: 59
TRIGL SERPL-MCNC: 99 MG/DL (ref 0–149)

## 2024-01-23 ENCOUNTER — TELEPHONE (OUTPATIENT)
Dept: CARDIOLOGY | Facility: CLINIC | Age: 64
End: 2024-01-23
Payer: COMMERCIAL

## 2024-01-23 DIAGNOSIS — E78.01 FAMILIAL HYPERCHOLESTEREMIA: Primary | ICD-10-CM

## 2024-01-23 RX ORDER — EVOLOCUMAB 140 MG/ML
140 INJECTION, SOLUTION SUBCUTANEOUS
Qty: 6 EACH | Refills: 3 | Status: SHIPPED | OUTPATIENT
Start: 2024-01-23 | End: 2024-01-29 | Stop reason: SDUPTHER

## 2024-01-25 ENCOUNTER — APPOINTMENT (OUTPATIENT)
Dept: CARDIOLOGY | Facility: CLINIC | Age: 64
End: 2024-01-25
Payer: COMMERCIAL

## 2024-01-25 NOTE — TELEPHONE ENCOUNTER
Pt left message saying insurance needs information for approval of the repatha.  Did you receive any prior authorization information

## 2024-01-26 ENCOUNTER — APPOINTMENT (OUTPATIENT)
Dept: OBSTETRICS AND GYNECOLOGY | Facility: CLINIC | Age: 64
End: 2024-01-26
Payer: COMMERCIAL

## 2024-01-29 DIAGNOSIS — E78.01 FAMILIAL HYPERCHOLESTEREMIA: ICD-10-CM

## 2024-01-29 RX ORDER — EVOLOCUMAB 140 MG/ML
140 INJECTION, SOLUTION SUBCUTANEOUS
Qty: 6 EACH | Refills: 3 | Status: SHIPPED | OUTPATIENT
Start: 2024-01-29

## 2024-01-29 NOTE — TELEPHONE ENCOUNTER
Result Communication   I did not see an auth come across for this, I will have a rx resent to pharmacy

## 2024-01-30 ENCOUNTER — TELEPHONE (OUTPATIENT)
Dept: CARDIOLOGY | Facility: CLINIC | Age: 64
End: 2024-01-30
Payer: COMMERCIAL

## 2024-02-09 ENCOUNTER — OFFICE VISIT (OUTPATIENT)
Dept: GASTROENTEROLOGY | Facility: EXTERNAL LOCATION | Age: 64
End: 2024-02-09
Payer: COMMERCIAL

## 2024-02-09 DIAGNOSIS — Z12.11 ENCOUNTER FOR SCREENING COLONOSCOPY: ICD-10-CM

## 2024-02-09 DIAGNOSIS — K64.8 OTHER HEMORRHOIDS: ICD-10-CM

## 2024-02-09 DIAGNOSIS — D12.8 BENIGN NEOPLASM OF RECTUM: Primary | ICD-10-CM

## 2024-02-09 PROCEDURE — 0753T DGTZ GLS MCRSCP SLD LEVEL IV: CPT

## 2024-02-09 PROCEDURE — 88305 TISSUE EXAM BY PATHOLOGIST: CPT

## 2024-02-09 PROCEDURE — 88305 TISSUE EXAM BY PATHOLOGIST: CPT | Performed by: PATHOLOGY

## 2024-02-09 PROCEDURE — 1036F TOBACCO NON-USER: CPT | Performed by: INTERNAL MEDICINE

## 2024-02-09 PROCEDURE — 45385 COLONOSCOPY W/LESION REMOVAL: CPT | Performed by: INTERNAL MEDICINE

## 2024-02-12 ENCOUNTER — LAB REQUISITION (OUTPATIENT)
Dept: LAB | Facility: HOSPITAL | Age: 64
End: 2024-02-12
Payer: COMMERCIAL

## 2024-02-13 ENCOUNTER — PREP FOR PROCEDURE (OUTPATIENT)
Dept: OPERATING ROOM | Facility: CLINIC | Age: 64
End: 2024-02-13

## 2024-02-13 ENCOUNTER — OFFICE VISIT (OUTPATIENT)
Dept: GYNECOLOGIC ONCOLOGY | Facility: CLINIC | Age: 64
End: 2024-02-13
Payer: COMMERCIAL

## 2024-02-13 VITALS
HEART RATE: 74 BPM | OXYGEN SATURATION: 97 % | RESPIRATION RATE: 16 BRPM | BODY MASS INDEX: 28.1 KG/M2 | TEMPERATURE: 96.3 F | HEIGHT: 66 IN | SYSTOLIC BLOOD PRESSURE: 135 MMHG | DIASTOLIC BLOOD PRESSURE: 80 MMHG | WEIGHT: 174.82 LBS

## 2024-02-13 DIAGNOSIS — Z71.9 ENCOUNTER FOR COUNSELING: ICD-10-CM

## 2024-02-13 DIAGNOSIS — N87.9 CERVICAL DYSPLASIA: Primary | ICD-10-CM

## 2024-02-13 PROCEDURE — 99214 OFFICE O/P EST MOD 30 MIN: CPT | Performed by: STUDENT IN AN ORGANIZED HEALTH CARE EDUCATION/TRAINING PROGRAM

## 2024-02-13 PROCEDURE — 99204 OFFICE O/P NEW MOD 45 MIN: CPT | Performed by: STUDENT IN AN ORGANIZED HEALTH CARE EDUCATION/TRAINING PROGRAM

## 2024-02-13 PROCEDURE — 1036F TOBACCO NON-USER: CPT | Performed by: STUDENT IN AN ORGANIZED HEALTH CARE EDUCATION/TRAINING PROGRAM

## 2024-02-13 ASSESSMENT — PAIN SCALES - GENERAL: PAINLEVEL: 0-NO PAIN

## 2024-02-13 NOTE — PROGRESS NOTES
Patient ID: Nargis Gill is a 64 y.o. female.  Referring Physician: No referring provider defined for this encounter.  Primary Care Provider: Kristina Souza DO      Subjective    HPI:  Nargis Gill is a 64 year old female presenting for cervical dysplasia (CIN3) identified on recent ECC. 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 Pap screenings have been negative since until 12/14/2023 which was c/f HSIL HPV neg and MARIANNE. EMB and colpo 1/12/2024 negative for dysplasia. ECC revealed fragments of CIN3. She denies fever, chills, constipation, diarrhea, vaginal bleeding, abdominal pain, nausea, vomiting, or any other symptoms.    PMH:  Familial hypercholesterolemia  CAD  Psoriasis    PSH:  Cataract surgery     OBHx:  2 SVDs. She is post-menopausal. Denies ABBY or HRT use.    Social:  Denies alcohol, tobacco, and recreational drug use. She lives alone and cares for her 87 year old father. Her two children are both physicians and are a good source of support. She works as a private practice optometrist.    FamHx:  Hx of renal cancer in mother. Otherwise negative for a history of breast, ovarian, uterine, colon, pancreatic, and GI cancer.     Screening:  Cervical cancer: 12/14/2023 c/f HSIL and MARIANNE. HPV negative.  Mammogram:  12/07/2023 negative  Colonoscopy: 2/09/024 1 polyp, path report pending      Review of Systems - Oncology     Objective   BSA: There is no height or weight on file to calculate BSA.  LMP  (LMP Unknown)      Family History   Problem Relation Name Age of Onset    Kidney cancer Mother      Other (ANXIETY) Father      Heart disease Father         Nargis Gill  reports that she has never smoked. She has never used smokeless tobacco.  She  reports no history of alcohol use.  She  reports no history of drug use.    Physical Exam  Constitutional:       General: She is not in acute distress.     Appearance: Normal appearance. She is not toxic-appearing.   HENT:      Head:  Normocephalic.      Mouth/Throat:      Mouth: Mucous membranes are moist.      Pharynx: Oropharynx is clear.   Eyes:      Extraocular Movements: Extraocular movements intact.      Conjunctiva/sclera: Conjunctivae normal.      Pupils: Pupils are equal, round, and reactive to light.   Cardiovascular:      Rate and Rhythm: Normal rate and regular rhythm.      Heart sounds: Normal heart sounds. No murmur heard.     No friction rub. No gallop.   Pulmonary:      Effort: Pulmonary effort is normal.      Breath sounds: Normal breath sounds. No wheezing or rhonchi.   Abdominal:      General: Bowel sounds are normal. There is no distension.      Palpations: Abdomen is soft.      Tenderness: There is no abdominal tenderness.   Musculoskeletal:         General: Normal range of motion.      Cervical back: Normal range of motion.   Skin:     General: Skin is warm.   Neurological:      General: No focal deficit present.      Mental Status: She is alert and oriented to person, place, and time.   Psychiatric:         Mood and Affect: Mood normal.         Behavior: Behavior normal.         Performance Status:  Asymptomatic    Assessment/Plan      Oncology History    No history exists.   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    # Cervical dysplasia  - Discussed pathogenesis of HPV related diseases and its relationship to precancers and cancers of the cervix, vagina, vulva, anus, larynx, pharynx  - Reviewed pathology, her recent pap was HPV negative, thinks she may have had a positive HPV in the past   - Discussed my recommendation for cold knife conization to excluded higher grade or malignant lesions   - Discussed expected risks of the procedure, hospital stay, and expected recovery  - She will not need PAT      I, or a resident under my supervision, was present with the medical student who participated in the  documentation of this note.  I have personally seen and examined the patient and performed the medical decision-making components. I have reviewed the medical student documentation and/or resident documentation and verified the findings in the note as written with additions or exceptions as stated in the body of the note.    Radha Kim MD

## 2024-02-16 NOTE — CPM/PAT H&P
CPM/PAT Evaluation       Name: Nargis Gill (Nargis Gill)  /Age: 1960/64 y.o.     TELEMEDICINE ENCOUNTER  Patient was contacted by telephone for preadmission testing perioperative risk assessment prior to surgery.    CHIEF COMPLAINT  Cervical dysplasia    HPI  Nargis Gill is a 64 with cervical dysplasia (ARTIS-3) revealed on recent EEC.  In  patient had LEEP procedure secondary to colposcopy showing ARTIS-2-3.  Pap screenings have been negative until 2023.  Colposcopy done on 2024 showed fragments of high-grade squamous intraepithelial lesion (ARTIS 3) from endocervix curettage.  Patient now scheduled for cold knife conization, endocervical curettage, and any other indicated procedures on 2024 at [facility].      ACTIVE PROBLEMS  Patient Active Problem List   Diagnosis    Pure hypercholesterolemia    Wellness examination    Coronary artery disease involving native coronary artery of native heart without angina pectoris    Agatston coronary artery calcium score greater than 400    Familial hypercholesteremia    HGSIL (high grade squamous intraepithelial lesion) on Pap smear of cervix    Atypical glandular cells of undetermined significance (MARIANNE) on cervical Pap smear    Cervical dysplasia     PAST MEDICAL HISTORY  Past Medical History:   Diagnosis Date    Agatston coronary artery calcium score greater than 400 2023    721    Carcinoma in situ of cervix, unspecified     Severe dysplasia of cervix (ARTIS III)    Coronary artery disease involving native coronary artery of native heart without angina pectoris 2023    Familial hypercholesteremia 2023    Moderate cervical dysplasia 2014    ARTIS II (cervical intraepithelial neoplasia II)    Other specified noninflammatory disorders of vagina     Vaginal odor    Personal history of other diseases of the circulatory system     History of varicose veins    Personal history of other diseases of the female genital  tract     Vaginal delivery    Personal history of other diseases of the female genital tract     History of vaginal discharge    Personal history of other infectious and parasitic diseases     History of HPV infection     SURGICAL HISTORY  Past Surgical History:   Procedure Laterality Date    CATARACT EXTRACTION  03/25/2014    Cataract Surgery    COLONOSCOPY      OTHER SURGICAL HISTORY  10/14/2021    Cystoscopy    OTHER SURGICAL HISTORY  10/14/2021    Tibia fracture repair    OTHER SURGICAL HISTORY  05/08/2014    Colposcopy Cervix With Biopsy(S)     ANESTHESIA HISTORY  Denies problems with anesthesia in the past such as PONV, prolonged sedation, awareness, dental damage, aspiration, cardiac arrest, difficult intubation, or unexpected hospital admissions.  Denies family history of malignant hyperthermia, or pseudocholinesterase deficiency.    SOCIAL HISTORY  Never smoker; denies alcohol, medical marijuana, recreational drug use.  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  She states she is able to do moderate ADLs such as heavy housework, light yard work.  She denies history of chest pain, DAVALOS.  METS 4    FAMILY HISTORY  Family History   Problem Relation Name Age of Onset    Kidney cancer Mother      Other (ANXIETY) Father      Heart disease Father       ALLERGIES  No Known Allergies    MEDICATIONS  No current facility-administered medications for this encounter.    Current Outpatient Medications:     co-enzyme Q-10 30 mg capsule, Take 1 capsule (30 mg) by mouth once daily., Disp: , Rfl:     multivitamin tablet, Take by mouth., Disp: , Rfl:     rosuvastatin (Crestor) 20 mg tablet, Take 1 tablet (20 mg) by mouth once daily., Disp: 30 tablet, Rfl: 11    aspirin 81 mg chewable tablet, Chew 1 tablet (81 mg) once daily. (Patient not taking: Reported on 2/16/2024), Disp: 30 tablet, Rfl: 11    clobetasol (Temovate) 0.05 % cream, Apply twice a day for 4 weeks,  Disp: 60 g, Rfl: 3    evolocumab (Repatha SureClick) 140 mg/mL injection, Inject 1 mL (140 mg) under the skin every 14 (fourteen) days., Disp: 6 each, Rfl: 3    Review of Systems   All other systems reviewed and are negative.    PHYSICAL EXAM  Deferred    AIRWAY EXAM  Deferred    VITALS  No vitals taken for telemedicine visit  Height: 5 feet 6 inches; weight: 174 pounds; BMI: 28.26    LABS  Lab Results   Component Value Date    WBC 5.9 12/04/2023    HGB 14.4 12/04/2023    HCT 44.8 12/04/2023    MCV 90 12/04/2023     12/04/2023     Lab Results   Component Value Date    GLUCOSE 99 01/19/2024    CALCIUM 10.0 01/19/2024     01/19/2024    K 4.2 01/19/2024    CO2 29 01/19/2024     01/19/2024    BUN 22 01/19/2024    CREATININE 0.75 01/19/2024       ASSESSMENT/PLAN  Cervical dysplasia  Cold knife conization, endocervical curettage      This note was created in part upon personal review of patient's medical records.  Speech recognition transcription software was used in the creation of this note. Despite proofreading, several typographical errors might be present that might affect the meaning of the content.

## 2024-02-16 NOTE — PREPROCEDURE INSTRUCTIONS
Pre-Op Instructions & Checklist   Your surgery has been scheduled at [facility] at [address] [place], [address], [address], in the [facility]. Parking is to the left of the main entrance.  You will be contacted about the time of your surgery the day before your surgery. If you are unable to answer the phone, a detailed voicemail message will be left. Make sure that your voicemail box is not full so a message can be left. If you have not received a call by 3:00 pm you may call [phone] between the hours of 3:00 and 4:00 pm. Please be available by phone the night before/day of surgery in case there is a change in the schedule which may require you to arrive earlier/later.    14 DAYS BEFORE SURGERY STOP TAKING WEIGHT LOSS MEDICATIONS      7 DAYS BEFORE SURGERY STOP THESE MEDICATIONS:  Multiple Vitamins containing Vitamin E  Herbal supplements, Fish Oil, garlic pills, turmeric, CoQ enzyme  Stop taking aspirin, and aspirin-containing products as well as NSAID's such as Advil, Motrin, Aleve, Ibuprofen. Tylenol is okay to take for pain relief.   If you are currently taking Coumadin/Warfarin, we will have to coordinate that with your PCP &/or the Anticoagulation Clinic.      THE DAY BEFORE SURGERY:  *Do not eat any food after midnight the night before surgery.   *You are permitted to have clear liquids such as water, apple juice, plain tea or coffee (no milk or creamer), clear electrolyte-replenishing drinks such as Pedialyte, Gatorade, or Powerade (not yogurt or pulp-containing smoothies or juices such as orange juice) up to 2 hours before your surgery.     DAY OF SURGERY, TAKE THESE MEDICATIONS with a small sip of water (if it is not listed, do not take it):  Take: Rosuvastatin                 ON THE MORNING OF SURGERY:  *Shower either the night before your surgery or the morning of your surgery  *Do not use moisturizers, creams, lotions or perfume, or make-up.  *Wear comfortable,  loose fitting clothing.   *All jewelry and valuables should be left at home.  *Prosthetic devices such as contact lenses, hearing aids, dentures, eyelash extensions, hairpins and body piercings must be removed before surgery. Bring containers for eyeglasses/contacts, dentures, or hearing aids with you.  Diabetics: Please check fasting blood sugars upon waking up.  If fasting blood sugars are <80ml/dl, please drink 100ml/3oz. of apple juice no later than 2 hours prior to surgery.       BRING WITH YOU:   *Photo ID and insurance card  *Current list of medicines and allergies  *Pacemaker/Defibrillator/Heart stent cards  *Copy of your complete Advanced Directive/DHPOA-if applicable     SMOKING:  *Quitting smoking can make a huge difference to your health and recovery from surgery.    *If you need help with quitting, call [phone]-[phone].  Alcohol:  *No alcoholic beverages for 48 hours before surgery.     AFTER OUTPATIENT SURGERY:  *A responsible adult MUST accompany you at the time of discharge and stay with you for 24 hours after your surgery.  *You may NOT drive yourself home after surgery.  *You may use a taxi or ride sharing service (Lyft, Uber) to return home ONLY if you are accompanied by a friend or family member.  *Instructions for resuming your medications will be provided by your surgeon.     CONTACT SURGEON'S OFFICE IF YOU DEVELOP:  * Fever =/> 100.4 F   * New respiratory symptoms (e.g. cough, shortness of breath, respiratory distress, sore throat)  * Recent loss of taste or smell  *Flu like symptoms such as headache, fatigue or gastrointestinal symptoms  * If you develop any open sores, shingles, burning or painful urination   AND/OR:  * You no longer wish to have the surgery.  * Any other personal circumstances change that may lead to the need to cancel or defer this surgery.  *You were admitted to any hospital within one week of your planned procedure.     If you have any questions regarding these  preoperative instructions you may call [phone]. If you have questions regarding you surgical procedure, or post-operative care/recovery please call your surgeon's office.

## 2024-02-16 NOTE — H&P (VIEW-ONLY)
CPM/PAT Evaluation       Name: Nargis Gill (Nargis Gill)  /Age: 1960/64 y.o.     TELEMEDICINE ENCOUNTER  Patient was contacted by telephone for preadmission testing perioperative risk assessment prior to surgery.    CHIEF COMPLAINT  Cervical dysplasia    HPI  Nargis Gill is a 64 with cervical dysplasia (ARTIS-3) revealed on recent EEC.  In  patient had LEEP procedure secondary to colposcopy showing ARTIS-2-3.  Pap screenings have been negative until 2023.  Colposcopy done on 2024 showed fragments of high-grade squamous intraepithelial lesion (ARTIS 3) from endocervix curettage.  Patient now scheduled for cold knife conization, endocervical curettage, and any other indicated procedures on 2024 at [facility].      ACTIVE PROBLEMS  Patient Active Problem List   Diagnosis    Pure hypercholesterolemia    Wellness examination    Coronary artery disease involving native coronary artery of native heart without angina pectoris    Agatston coronary artery calcium score greater than 400    Familial hypercholesteremia    HGSIL (high grade squamous intraepithelial lesion) on Pap smear of cervix    Atypical glandular cells of undetermined significance (MARIANNE) on cervical Pap smear    Cervical dysplasia     PAST MEDICAL HISTORY  Past Medical History:   Diagnosis Date    Agatston coronary artery calcium score greater than 400 2023    721    Carcinoma in situ of cervix, unspecified     Severe dysplasia of cervix (ARTIS III)    Coronary artery disease involving native coronary artery of native heart without angina pectoris 2023    Familial hypercholesteremia 2023    Moderate cervical dysplasia 2014    ARTIS II (cervical intraepithelial neoplasia II)    Other specified noninflammatory disorders of vagina     Vaginal odor    Personal history of other diseases of the circulatory system     History of varicose veins    Personal history of other diseases of the female genital  tract     Vaginal delivery    Personal history of other diseases of the female genital tract     History of vaginal discharge    Personal history of other infectious and parasitic diseases     History of HPV infection     SURGICAL HISTORY  Past Surgical History:   Procedure Laterality Date    CATARACT EXTRACTION  03/25/2014    Cataract Surgery    COLONOSCOPY      OTHER SURGICAL HISTORY  10/14/2021    Cystoscopy    OTHER SURGICAL HISTORY  10/14/2021    Tibia fracture repair    OTHER SURGICAL HISTORY  05/08/2014    Colposcopy Cervix With Biopsy(S)     ANESTHESIA HISTORY  Denies problems with anesthesia in the past such as PONV, prolonged sedation, awareness, dental damage, aspiration, cardiac arrest, difficult intubation, or unexpected hospital admissions.  Denies family history of malignant hyperthermia, or pseudocholinesterase deficiency.    SOCIAL HISTORY  Never smoker; denies alcohol, medical marijuana, recreational drug use.  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  She states she is able to do moderate ADLs such as heavy housework, light yard work.  She denies history of chest pain, DAVALOS.  METS 4    FAMILY HISTORY  Family History   Problem Relation Name Age of Onset    Kidney cancer Mother      Other (ANXIETY) Father      Heart disease Father       ALLERGIES  No Known Allergies    MEDICATIONS  No current facility-administered medications for this encounter.    Current Outpatient Medications:     co-enzyme Q-10 30 mg capsule, Take 1 capsule (30 mg) by mouth once daily., Disp: , Rfl:     multivitamin tablet, Take by mouth., Disp: , Rfl:     rosuvastatin (Crestor) 20 mg tablet, Take 1 tablet (20 mg) by mouth once daily., Disp: 30 tablet, Rfl: 11    aspirin 81 mg chewable tablet, Chew 1 tablet (81 mg) once daily. (Patient not taking: Reported on 2/16/2024), Disp: 30 tablet, Rfl: 11    clobetasol (Temovate) 0.05 % cream, Apply twice a day for 4 weeks,  Disp: 60 g, Rfl: 3    evolocumab (Repatha SureClick) 140 mg/mL injection, Inject 1 mL (140 mg) under the skin every 14 (fourteen) days., Disp: 6 each, Rfl: 3    Review of Systems   All other systems reviewed and are negative.    PHYSICAL EXAM  Deferred    AIRWAY EXAM  Deferred    VITALS  No vitals taken for telemedicine visit  Height: 5 feet 6 inches; weight: 174 pounds; BMI: 28.26    LABS  Lab Results   Component Value Date    WBC 5.9 12/04/2023    HGB 14.4 12/04/2023    HCT 44.8 12/04/2023    MCV 90 12/04/2023     12/04/2023     Lab Results   Component Value Date    GLUCOSE 99 01/19/2024    CALCIUM 10.0 01/19/2024     01/19/2024    K 4.2 01/19/2024    CO2 29 01/19/2024     01/19/2024    BUN 22 01/19/2024    CREATININE 0.75 01/19/2024       ASSESSMENT/PLAN  Cervical dysplasia  Cold knife conization, endocervical curettage      This note was created in part upon personal review of patient's medical records.  Speech recognition transcription software was used in the creation of this note. Despite proofreading, several typographical errors might be present that might affect the meaning of the content.

## 2024-02-28 ENCOUNTER — OFFICE VISIT (OUTPATIENT)
Dept: DERMATOLOGY | Facility: CLINIC | Age: 64
End: 2024-02-28
Payer: COMMERCIAL

## 2024-02-28 DIAGNOSIS — D18.01 CHERRY ANGIOMA: ICD-10-CM

## 2024-02-28 DIAGNOSIS — L57.8 SUN-DAMAGED SKIN: ICD-10-CM

## 2024-02-28 DIAGNOSIS — L24.9 IRRITANT CONTACT DERMATITIS, UNSPECIFIED TRIGGER: ICD-10-CM

## 2024-02-28 DIAGNOSIS — L82.1 SEBORRHEIC KERATOSES: ICD-10-CM

## 2024-02-28 DIAGNOSIS — L98.491 NON-PRESSURE CHRONIC ULCER OF SKIN OF OTHER SITES LIMITED TO BREAKDOWN OF SKIN (MULTI): ICD-10-CM

## 2024-02-28 DIAGNOSIS — D22.9 MULTIPLE BENIGN MELANOCYTIC NEVI: ICD-10-CM

## 2024-02-28 DIAGNOSIS — L40.9 PSORIASIS: Primary | ICD-10-CM

## 2024-02-28 PROCEDURE — 99214 OFFICE O/P EST MOD 30 MIN: CPT | Performed by: DERMATOLOGY

## 2024-02-28 PROCEDURE — 1036F TOBACCO NON-USER: CPT | Performed by: DERMATOLOGY

## 2024-02-28 PROCEDURE — 87801 DETECT AGNT MULT DNA AMPLI: CPT | Performed by: DERMATOLOGY

## 2024-02-28 RX ORDER — TACROLIMUS 1 MG/G
OINTMENT TOPICAL 2 TIMES DAILY
Qty: 60 G | Refills: 11 | Status: SHIPPED | OUTPATIENT
Start: 2024-02-28 | End: 2025-02-27

## 2024-02-28 ASSESSMENT — PHYSICIAN GLOBAL ASSESSMENT (PGA): WHAT IS THE PGA: PHYSICIAN GLOBAL ASSESSMENT:  1 - MINIMAL

## 2024-02-28 ASSESSMENT — DERMATOLOGY PATIENT ASSESSMENT
DO YOU HAVE IRREGULAR MENSTRUAL CYCLES: NO
HAVE YOU HAD OR DO YOU HAVE VASCULAR DISEASE: NO
DO YOU HAVE ANY NEW OR CHANGING LESIONS: NO
ARE YOU AN ORGAN TRANSPLANT RECIPIENT: NO
HAVE YOU HAD OR DO YOU HAVE A STAPH INFECTION: NO
DO YOU USE SUNSCREEN: DAILY
ARE YOU TRYING TO GET PREGNANT: NO
ARE YOU ON BIRTH CONTROL: NO
DO YOU USE A TANNING BED: NO

## 2024-02-28 ASSESSMENT — BODY SURFACE AREA (BSA): WHAT IS THE BSA PERCENTAGE: < 3% BODY SURFACE AREA INVOLVED

## 2024-02-28 ASSESSMENT — PATIENT GLOBAL ASSESSMENT (PGA): PATIENT GLOBAL ASSESSMENT: PATIENT GLOBAL ASSESSMENT:  1 - CLEAR

## 2024-02-28 ASSESSMENT — ITCH NUMERIC RATING SCALE: HOW SEVERE IS YOUR ITCHING?: 0

## 2024-02-28 ASSESSMENT — DERMATOLOGY QUALITY OF LIFE (QOL) ASSESSMENT
ARE THERE EXCLUSIONS OR EXCEPTIONS FOR THE QUALITY OF LIFE ASSESSMENT: NO
RATE HOW BOTHERED YOU ARE BY SYMPTOMS OF YOUR SKIN PROBLEM (EG, ITCHING, STINGING BURNING, HURTING OR SKIN IRRITATION): 0 - NEVER BOTHERED
DATE THE QUALITY-OF-LIFE ASSESSMENT WAS COMPLETED: 66898
RATE HOW BOTHERED YOU ARE BY EFFECTS OF YOUR SKIN PROBLEMS ON YOUR ACTIVITIES (EG, GOING OUT, ACCOMPLISHING WHAT YOU WANT, WORK ACTIVITIES OR YOUR RELATIONSHIPS WITH OTHERS): 0 - NEVER BOTHERED
RATE HOW EMOTIONALLY BOTHERED YOU ARE BY YOUR SKIN PROBLEM (FOR EXAMPLE, WORRY, EMBARRASSMENT, FRUSTRATION): 0 - NEVER BOTHERED
WHAT SINGLE SKIN CONDITION LISTED BELOW IS THE PATIENT ANSWERING THE QUALITY-OF-LIFE ASSESSMENT QUESTIONS ABOUT: PSORIASIS

## 2024-02-28 NOTE — PROGRESS NOTES
Subjective     Nargis Gill is a 64 y.o. female who presents for the following: Skin Check (Clobetasol worked very well for her psoriasis.).     Patient last seen 12/20/23 for psoriasis. During that visit, patient denied any morning stiffness, joint swelling, nail involvement. Patient was started on clobetasol 0.05% cream BID for 3-4 weeks.     Patient has been using clobetasol at least once a day since her last visit. Also reports that she has joint pains but unsure how long it stays for during the morning. States that she has bilateral nail clubbing and has a history of coronary artery disease, which she is currently seeing a cardiologist for.     Patient here for a skin exam.     Skin Cancer History  No skin cancer on file.    Review of Systems:  No other skin or systemic complaints other than what is documented elsewhere in the note.    The following portions of the chart were reviewed this encounter and updated as appropriate:       Specialty Problems    None    Past Medical History:  Nargis Gill  has a past medical history of Agatston coronary artery calcium score greater than 400 (12/19/2023), Carcinoma in situ of cervix, unspecified, Coronary artery disease involving native coronary artery of native heart without angina pectoris (12/19/2023), Familial hypercholesteremia (12/19/2023), Moderate cervical dysplasia (12/16/2014), Other specified noninflammatory disorders of vagina, Personal history of other diseases of the circulatory system, Personal history of other diseases of the female genital tract, Personal history of other diseases of the female genital tract, and Personal history of other infectious and parasitic diseases.    Past Surgical History:  Nargis Gill  has a past surgical history that includes Cataract extraction (03/25/2014); Other surgical history (10/14/2021); Other surgical history (10/14/2021); Other surgical history (05/08/2014); and Colonoscopy.    Family History:  Patient family  history includes ANXIETY in her father; Heart disease in her father; Kidney cancer in her mother.    Social History:  Nargis Gill  reports that she has never smoked. She has never used smokeless tobacco. She reports that she does not drink alcohol and does not use drugs.    Allergies:  Patient has no known allergies.    Current Medications / CAM's:    Current Outpatient Medications:     aspirin 81 mg chewable tablet, Chew 1 tablet (81 mg) once daily. (Patient not taking: Reported on 2/16/2024), Disp: 30 tablet, Rfl: 11    co-enzyme Q-10 30 mg capsule, Take 1 capsule (30 mg) by mouth once daily., Disp: , Rfl:     evolocumab (Repatha SureClick) 140 mg/mL injection, Inject 1 mL (140 mg) under the skin every 14 (fourteen) days., Disp: 6 each, Rfl: 3    multivitamin tablet, Take by mouth., Disp: , Rfl:     rosuvastatin (Crestor) 20 mg tablet, Take 1 tablet (20 mg) by mouth once daily., Disp: 30 tablet, Rfl: 11    tacrolimus (Protopic) 0.1 % ointment, Apply topically 2 times a day. To the areas involved by psoriasis, Disp: 60 g, Rfl: 11     Objective   Well appearing patient in no apparent distress; mood and affect are within normal limits.    A full examination was performed including scalp, head, eyes, ears, nose, lips, neck, chest, axillae, abdomen, back, buttocks, bilateral upper extremities, bilateral lower extremities, hands, feet, fingers, toes, fingernails, toenails, and genitalia. All findings within normal limits unless otherwise noted below.     Erythematous plaques with scale on bilateral upper and lower extremities. Areas of skin atrophy appreciated on exam today from frequent topical steroid use    - scattered small bright red papules and macules    - Scattered waxy tan/grey/brown papules with horn cysts    - scattered regular brown macules and papules    - scattered tan macules, telangiectasias, and general photo-damage    Right Thumb Nail Plate  On the bilateral fingernails is clubbing with no evidence  of nail pitting or onycholysis. On the bilateral distal finger tips are erythematous poorly demarcated papules.    Glans of Clitoris  2mm erythematous papule with overlying fibrin         Assessment/Plan   Psoriasis    Psoriasis Vulgaris, <3% BSA  -Reports morning stiffness of unsure duration and some intermittent joint swelling  -Skin atrophy observed today from consistent topical clobetasol use, will transition patient to a non-steroidal topical treatment  -Start tacrolimus 0.1% ointment twice a day to the affected areas of the psoriasis. Risks/benefits reviewed including discussion of black box warning, which is more relevant in systemic pill dosing and not noted with the limited use of the topical.   -Discussed increased risk of heart disease with psoriasis. Encouraged continuing regular checks with her primary care physician. Patient also following a cardiologist currently.   - Discussed seeing a rheumatologist for further management and assessment to determine if patient has psoriatic arthritis. Phone number given to patient for scheduling.  - Follow-up in 4 months    Related Procedures  Follow Up In Dermatology - Established Patient  Follow Up In Dermatology - Established Patient    Related Medications  tacrolimus (Protopic) 0.1 % ointment  Apply topically 2 times a day. To the areas involved by psoriasis    Irritant contact dermatitis, unspecified trigger  Right Thumb Nail Plate    Hand Dermatitis - bilateral fingers and fingernails.    - The potentially chronic and intermittently flaring nature of this condition and treatment options were discussed with the patient today.  Admits to picking cuticles/nails which is worsening condition  - Emphasized the importance of dry, sensitive skin care and good hand hygiene, including minimizing the frequency and duration of hand-washing as much as is safely possible; using a lukewarm, but not hot water and a mild soap, such as Dove; and frequent and aggressive  moisturization, at least twice daily and immediately following hand-washing, with recommended over-the-counter emollients, such as Vaseline ointment.    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 Will discuss further management once results come back in  - she is 7 days into sore, so out of window for valtrex, but if + can Rx for future flares and  appropriately.    Specimen A - VZV By PCR Qualitative Skin/Mucosa Lesion, HSV PCR, Skin/Mucosa  Differential Diagnosis: r/o HSV  Check Margins Yes/No?:    Comments:    Dermpath Lab: N/A    Cherry angioma    Cherry angioma(s)  - Discussed benign nature and that no treatment is necessary unless it becomes painful or increases in size. Patient opts for clinical monitoring at this time.      Related Procedures  Follow Up In Dermatology - Established Patient    Seborrheic keratoses    Seborrheic keratosis (-es)  - Discussed benign nature and that no treatment is necessary unless it becomes painful or increases in size. Patient opts for clinical monitoring at this time.      Related Procedures  Follow Up In Dermatology - Established Patient    Multiple benign melanocytic nevi    Benign melanocytic nevi  - Discussed benign nature and that no treatment is necessary unless it becomes painful or increases in size. Patient opts for clinical monitoring at this time.    - Sun protective behavior reviewed and encouraged including the use of over-the-counter sunscreen with SPF30+ daily (reapply every 1.5 hours when outdoors), UPF clothing, broad rimmed hats, sunglasses, and avoidance of midday sun. Home skin monitoring encouraged and how to monitor for skin cancer (changing or new moles, new rapidly growing or non-healing  lesions) reviewed. Patient encouraged to call with interval concerns or changes.      Related Procedures  Follow Up In Dermatology - Established Patient    Sun-damaged skin    Actinically damaged skin-  - Sun protective behavior reviewed and encouraged including the use of over-the-counter sunscreen with SPF30+ daily (reapply every 1.5 hours when outdoors), UPF clothing, broad rimmed hats, sunglasses, and avoidance of midday sun. Home skin monitoring encouraged and how to monitor for skin cancer (changing or new moles, new rapidly growing or non-healing lesions) reviewed. Patient encouraged to call with interval concerns or changes.      Related Procedures  Follow Up In Dermatology - Established Patient       Haider Leblanc DO  PGY-3 Dermatology    I was present during all critical and key portions of the procedure(s) and immediately available to furnish services the entire duration.  See resident note for details.     Trina Ridley MD

## 2024-03-01 LAB
HSV1 DNA SKIN QL NAA+PROBE: NOT DETECTED
HSV2 DNA SKIN QL NAA+PROBE: NOT DETECTED
VZV DNA SPEC QL NAA+PROBE: NOT DETECTED

## 2024-03-04 ENCOUNTER — ANESTHESIA EVENT (OUTPATIENT)
Dept: OPERATING ROOM | Facility: CLINIC | Age: 64
End: 2024-03-04
Payer: COMMERCIAL

## 2024-03-05 ENCOUNTER — LAB (OUTPATIENT)
Dept: LAB | Facility: LAB | Age: 64
End: 2024-03-05
Payer: COMMERCIAL

## 2024-03-05 ENCOUNTER — OFFICE VISIT (OUTPATIENT)
Dept: PRIMARY CARE | Facility: CLINIC | Age: 64
End: 2024-03-05
Payer: COMMERCIAL

## 2024-03-05 VITALS
BODY MASS INDEX: 27.97 KG/M2 | DIASTOLIC BLOOD PRESSURE: 76 MMHG | OXYGEN SATURATION: 97 % | WEIGHT: 173 LBS | HEART RATE: 78 BPM | SYSTOLIC BLOOD PRESSURE: 112 MMHG

## 2024-03-05 DIAGNOSIS — E78.00 PURE HYPERCHOLESTEROLEMIA: Primary | ICD-10-CM

## 2024-03-05 DIAGNOSIS — E78.00 PURE HYPERCHOLESTEROLEMIA: ICD-10-CM

## 2024-03-05 LAB
ALBUMIN SERPL BCP-MCNC: 4.3 G/DL (ref 3.4–5)
ALP SERPL-CCNC: 68 U/L (ref 33–136)
ALT SERPL W P-5'-P-CCNC: 15 U/L (ref 7–45)
ANION GAP SERPL CALC-SCNC: 12 MMOL/L (ref 10–20)
AST SERPL W P-5'-P-CCNC: 17 U/L (ref 9–39)
BILIRUB SERPL-MCNC: 0.5 MG/DL (ref 0–1.2)
BUN SERPL-MCNC: 12 MG/DL (ref 6–23)
CALCIUM SERPL-MCNC: 10.4 MG/DL (ref 8.6–10.6)
CHLORIDE SERPL-SCNC: 106 MMOL/L (ref 98–107)
CO2 SERPL-SCNC: 30 MMOL/L (ref 21–32)
CREAT SERPL-MCNC: 0.8 MG/DL (ref 0.5–1.05)
EGFRCR SERPLBLD CKD-EPI 2021: 83 ML/MIN/1.73M*2
GLUCOSE SERPL-MCNC: 109 MG/DL (ref 74–99)
POTASSIUM SERPL-SCNC: 4.4 MMOL/L (ref 3.5–5.3)
PROT SERPL-MCNC: 6.7 G/DL (ref 6.4–8.2)
SODIUM SERPL-SCNC: 144 MMOL/L (ref 136–145)

## 2024-03-05 PROCEDURE — 1036F TOBACCO NON-USER: CPT | Performed by: FAMILY MEDICINE

## 2024-03-05 PROCEDURE — 36415 COLL VENOUS BLD VENIPUNCTURE: CPT

## 2024-03-05 PROCEDURE — 83704 LIPOPROTEIN BLD QUAN PART: CPT

## 2024-03-05 PROCEDURE — 80053 COMPREHEN METABOLIC PANEL: CPT

## 2024-03-05 PROCEDURE — 99213 OFFICE O/P EST LOW 20 MIN: CPT | Performed by: FAMILY MEDICINE

## 2024-03-05 ASSESSMENT — ENCOUNTER SYMPTOMS
DEPRESSION: 0
OCCASIONAL FEELINGS OF UNSTEADINESS: 0
LOSS OF SENSATION IN FEET: 0

## 2024-03-05 NOTE — PROGRESS NOTES
Subjective   Patient ID: Nargis Gill is a 64 y.o. female who presents for Follow-up (3 MO FOLLOW UP).    HPI   Pt is here for lipid follow up  On repatha and crestor  She has taken 1/2 crestor    Review of Systems   All other systems reviewed and are negative.      Objective   /76   Pulse 78   Wt 78.5 kg (173 lb)   LMP  (LMP Unknown)   SpO2 97%   BMI 27.97 kg/m²     Physical Exam  Constitutional:       Appearance: Normal appearance.   HENT:      Head: Normocephalic and atraumatic.      Right Ear: Tympanic membrane, ear canal and external ear normal.      Left Ear: Tympanic membrane, ear canal and external ear normal.      Nose: Nose normal.      Mouth/Throat:      Mouth: Mucous membranes are moist.      Pharynx: Oropharynx is clear.   Eyes:      Extraocular Movements: Extraocular movements intact.      Conjunctiva/sclera: Conjunctivae normal.      Pupils: Pupils are equal, round, and reactive to light.   Cardiovascular:      Rate and Rhythm: Normal rate and regular rhythm.      Pulses: Normal pulses.      Heart sounds: Normal heart sounds.   Pulmonary:      Effort: Pulmonary effort is normal.      Breath sounds: Normal breath sounds.   Abdominal:      General: Abdomen is flat. Bowel sounds are normal.      Palpations: Abdomen is soft.   Genitourinary:     Comments: nl  Musculoskeletal:         General: Normal range of motion.      Cervical back: Normal range of motion and neck supple.   Skin:     General: Skin is warm and dry.      Capillary Refill: Capillary refill takes less than 2 seconds.   Neurological:      General: No focal deficit present.      Mental Status: She is alert and oriented to person, place, and time.   Psychiatric:         Mood and Affect: Mood normal.         Behavior: Behavior normal.         Thought Content: Thought content normal.         Assessment/Plan   Diagnoses and all orders for this visit:  Pure hypercholesterolemia  -     Lipoprotein NMR; Future  -     Lipid Panel;  Future  -     Comprehensive Metabolic Panel; Future       Cont regimen    Rto 12-24 for cpe

## 2024-03-06 ENCOUNTER — HOSPITAL ENCOUNTER (OUTPATIENT)
Facility: CLINIC | Age: 64
Setting detail: OUTPATIENT SURGERY
Discharge: HOME | End: 2024-03-06
Attending: STUDENT IN AN ORGANIZED HEALTH CARE EDUCATION/TRAINING PROGRAM | Admitting: STUDENT IN AN ORGANIZED HEALTH CARE EDUCATION/TRAINING PROGRAM
Payer: COMMERCIAL

## 2024-03-06 ENCOUNTER — ANESTHESIA (OUTPATIENT)
Dept: OPERATING ROOM | Facility: CLINIC | Age: 64
End: 2024-03-06
Payer: COMMERCIAL

## 2024-03-06 VITALS
RESPIRATION RATE: 16 BRPM | BODY MASS INDEX: 28.38 KG/M2 | HEART RATE: 72 BPM | HEIGHT: 66 IN | DIASTOLIC BLOOD PRESSURE: 85 MMHG | SYSTOLIC BLOOD PRESSURE: 124 MMHG | OXYGEN SATURATION: 94 % | WEIGHT: 176.59 LBS | TEMPERATURE: 97.2 F

## 2024-03-06 DIAGNOSIS — N87.9 CERVICAL DYSPLASIA: ICD-10-CM

## 2024-03-06 PROCEDURE — 88307 TISSUE EXAM BY PATHOLOGIST: CPT | Performed by: PATHOLOGY

## 2024-03-06 PROCEDURE — 2500000004 HC RX 250 GENERAL PHARMACY W/ HCPCS (ALT 636 FOR OP/ED): Performed by: STUDENT IN AN ORGANIZED HEALTH CARE EDUCATION/TRAINING PROGRAM

## 2024-03-06 PROCEDURE — A57520 PR CONIZATION CERVIX,KNIFE/LASER

## 2024-03-06 PROCEDURE — 7100000009 HC PHASE TWO TIME - INITIAL BASE CHARGE: Performed by: STUDENT IN AN ORGANIZED HEALTH CARE EDUCATION/TRAINING PROGRAM

## 2024-03-06 PROCEDURE — 3700000001 HC GENERAL ANESTHESIA TIME - INITIAL BASE CHARGE: Performed by: STUDENT IN AN ORGANIZED HEALTH CARE EDUCATION/TRAINING PROGRAM

## 2024-03-06 PROCEDURE — A4217 STERILE WATER/SALINE, 500 ML: HCPCS | Performed by: STUDENT IN AN ORGANIZED HEALTH CARE EDUCATION/TRAINING PROGRAM

## 2024-03-06 PROCEDURE — 88307 TISSUE EXAM BY PATHOLOGIST: CPT | Mod: TC,SUR | Performed by: STUDENT IN AN ORGANIZED HEALTH CARE EDUCATION/TRAINING PROGRAM

## 2024-03-06 PROCEDURE — 2500000005 HC RX 250 GENERAL PHARMACY W/O HCPCS: Performed by: STUDENT IN AN ORGANIZED HEALTH CARE EDUCATION/TRAINING PROGRAM

## 2024-03-06 PROCEDURE — A57520 PR CONIZATION CERVIX,KNIFE/LASER: Performed by: ANESTHESIOLOGY

## 2024-03-06 PROCEDURE — 3700000002 HC GENERAL ANESTHESIA TIME - EACH INCREMENTAL 1 MINUTE: Performed by: STUDENT IN AN ORGANIZED HEALTH CARE EDUCATION/TRAINING PROGRAM

## 2024-03-06 PROCEDURE — 2500000004 HC RX 250 GENERAL PHARMACY W/ HCPCS (ALT 636 FOR OP/ED)

## 2024-03-06 PROCEDURE — 88305 TISSUE EXAM BY PATHOLOGIST: CPT | Performed by: PATHOLOGY

## 2024-03-06 PROCEDURE — 7100000010 HC PHASE TWO TIME - EACH INCREMENTAL 1 MINUTE: Performed by: STUDENT IN AN ORGANIZED HEALTH CARE EDUCATION/TRAINING PROGRAM

## 2024-03-06 PROCEDURE — 3600000003 HC OR TIME - INITIAL BASE CHARGE - PROCEDURE LEVEL THREE: Performed by: STUDENT IN AN ORGANIZED HEALTH CARE EDUCATION/TRAINING PROGRAM

## 2024-03-06 PROCEDURE — 3600000008 HC OR TIME - EACH INCREMENTAL 1 MINUTE - PROCEDURE LEVEL THREE: Performed by: STUDENT IN AN ORGANIZED HEALTH CARE EDUCATION/TRAINING PROGRAM

## 2024-03-06 PROCEDURE — 57520 CONIZATION OF CERVIX: CPT | Performed by: STUDENT IN AN ORGANIZED HEALTH CARE EDUCATION/TRAINING PROGRAM

## 2024-03-06 RX ORDER — ACETAMINOPHEN 500 MG
1000 TABLET ORAL EVERY 6 HOURS PRN
Qty: 30 TABLET | Refills: 0 | Status: SHIPPED | OUTPATIENT
Start: 2024-03-06 | End: 2024-03-16

## 2024-03-06 RX ORDER — ONDANSETRON HYDROCHLORIDE 2 MG/ML
4 INJECTION, SOLUTION INTRAVENOUS ONCE AS NEEDED
Status: DISCONTINUED | OUTPATIENT
Start: 2024-03-06 | End: 2024-03-06 | Stop reason: HOSPADM

## 2024-03-06 RX ORDER — SODIUM CHLORIDE, SODIUM LACTATE, POTASSIUM CHLORIDE, CALCIUM CHLORIDE 600; 310; 30; 20 MG/100ML; MG/100ML; MG/100ML; MG/100ML
100 INJECTION, SOLUTION INTRAVENOUS CONTINUOUS
Status: DISCONTINUED | OUTPATIENT
Start: 2024-03-06 | End: 2024-03-06 | Stop reason: HOSPADM

## 2024-03-06 RX ORDER — LIDOCAINE IN NACL,ISO-OSMOT/PF 30 MG/3 ML
0.1 SYRINGE (ML) INJECTION ONCE
Status: DISCONTINUED | OUTPATIENT
Start: 2024-03-06 | End: 2024-03-06 | Stop reason: HOSPADM

## 2024-03-06 RX ORDER — PROPOFOL 10 MG/ML
INJECTION, EMULSION INTRAVENOUS AS NEEDED
Status: DISCONTINUED | OUTPATIENT
Start: 2024-03-06 | End: 2024-03-06

## 2024-03-06 RX ORDER — OXYCODONE HYDROCHLORIDE 5 MG/1
5 TABLET ORAL EVERY 4 HOURS PRN
Status: DISCONTINUED | OUTPATIENT
Start: 2024-03-06 | End: 2024-03-06 | Stop reason: HOSPADM

## 2024-03-06 RX ORDER — LIDOCAINE HYDROCHLORIDE 10 MG/ML
INJECTION INFILTRATION; PERINEURAL AS NEEDED
Status: DISCONTINUED | OUTPATIENT
Start: 2024-03-06 | End: 2024-03-06 | Stop reason: HOSPADM

## 2024-03-06 RX ORDER — SODIUM CHLORIDE 0.9 G/100ML
IRRIGANT IRRIGATION AS NEEDED
Status: DISCONTINUED | OUTPATIENT
Start: 2024-03-06 | End: 2024-03-06 | Stop reason: HOSPADM

## 2024-03-06 RX ORDER — IBUPROFEN 800 MG/1
800 TABLET ORAL 3 TIMES DAILY PRN
Qty: 60 TABLET | Refills: 0 | Status: SHIPPED | OUTPATIENT
Start: 2024-03-06 | End: 2024-03-21 | Stop reason: WASHOUT

## 2024-03-06 RX ORDER — ACETAMINOPHEN 325 MG/1
650 TABLET ORAL EVERY 4 HOURS PRN
Status: DISCONTINUED | OUTPATIENT
Start: 2024-03-06 | End: 2024-03-06 | Stop reason: HOSPADM

## 2024-03-06 RX ORDER — FENTANYL CITRATE 50 UG/ML
25 INJECTION, SOLUTION INTRAMUSCULAR; INTRAVENOUS EVERY 5 MIN PRN
Status: DISCONTINUED | OUTPATIENT
Start: 2024-03-06 | End: 2024-03-06 | Stop reason: HOSPADM

## 2024-03-06 RX ADMIN — PROPOFOL 70 MG: 10 INJECTION, EMULSION INTRAVENOUS at 10:08

## 2024-03-06 RX ADMIN — PROPOFOL 20 MG: 10 INJECTION, EMULSION INTRAVENOUS at 10:14

## 2024-03-06 RX ADMIN — PROPOFOL 70 MG: 10 INJECTION, EMULSION INTRAVENOUS at 10:05

## 2024-03-06 RX ADMIN — SODIUM CHLORIDE, SODIUM LACTATE, POTASSIUM CHLORIDE, AND CALCIUM CHLORIDE 100 ML/HR: .6; .31; .03; .02 INJECTION, SOLUTION INTRAVENOUS at 09:45

## 2024-03-06 RX ADMIN — PROPOFOL 20 MG: 10 INJECTION, EMULSION INTRAVENOUS at 10:21

## 2024-03-06 RX ADMIN — PROPOFOL 20 MG: 10 INJECTION, EMULSION INTRAVENOUS at 10:25

## 2024-03-06 SDOH — HEALTH STABILITY: MENTAL HEALTH: CURRENT SMOKER: 0

## 2024-03-06 ASSESSMENT — PAIN - FUNCTIONAL ASSESSMENT
PAIN_FUNCTIONAL_ASSESSMENT: 0-10

## 2024-03-06 ASSESSMENT — COLUMBIA-SUICIDE SEVERITY RATING SCALE - C-SSRS
2. HAVE YOU ACTUALLY HAD ANY THOUGHTS OF KILLING YOURSELF?: NO
1. IN THE PAST MONTH, HAVE YOU WISHED YOU WERE DEAD OR WISHED YOU COULD GO TO SLEEP AND NOT WAKE UP?: NO
6. HAVE YOU EVER DONE ANYTHING, STARTED TO DO ANYTHING, OR PREPARED TO DO ANYTHING TO END YOUR LIFE?: NO

## 2024-03-06 ASSESSMENT — PAIN SCALES - GENERAL
PAINLEVEL_OUTOF10: 0 - NO PAIN

## 2024-03-06 NOTE — ANESTHESIA POSTPROCEDURE EVALUATION
Patient: Nargis Gill    Procedure Summary       Date: 03/06/24 Room / Location: [facility] [place] [facility] OR 05 / Virtual [facility] [place] [facility] OR    Anesthesia Start: 1000 Anesthesia Stop: 1037    Procedure: Cold knife conization, endocervical curettage, any other indicated procedures Diagnosis:       Cervical dysplasia      (Cervical dysplasia [N87.9])    Surgeons: Radha Kim MD Responsible Provider: Mima Hackett MD    Anesthesia Type: MAC ASA Status: 2            Anesthesia Type: MAC    Vitals Value Taken Time   /92 03/06/24 1037   Temp 36.2 03/06/24 1037   Pulse 82 03/06/24 1037   Resp 16 03/06/24 1037   SpO2 95 03/06/24 1037       Anesthesia Post Evaluation    Patient location during evaluation: bedside  Patient participation: complete - patient participated  Level of consciousness: awake  Pain management: adequate  Airway patency: patent  Cardiovascular status: acceptable  Respiratory status: acceptable and face mask  Hydration status: acceptable  Postoperative Nausea and Vomiting: none        No notable events documented.

## 2024-03-06 NOTE — ANESTHESIA PREPROCEDURE EVALUATION
Patient: Nargis Gill    Procedure Information       Date/Time: 03/06/24 1030    Procedure: Cold knife conization, endocervical curettage, any other indicated procedures    [place] [facility] [place] [facility] [place] [facility] [place] [facility] [place]    Surgeons: Radha Kim MD            Relevant Problems   Anesthesia (within normal limits)      Cardiovascular   (+) Coronary artery disease involving native coronary artery of native heart without angina pectoris   (+) Familial hypercholesteremia   (+) Pure hypercholesterolemia      Endocrine (within normal limits)      /Renal (within normal limits)      Neuro/Psych (within normal limits)      Pulmonary (within normal limits)      Hematology (within normal limits)      Musculoskeletal (within normal limits)      Infectious Disease (within normal limits)       Clinical information reviewed:   Tobacco  Allergies  Meds   Med Hx  Surg Hx  OB Status  Fam Hx  Soc   Hx        NPO Detail:  NPO/Void Status  Date of Last Liquid: 03/06/24  Time of Last Liquid: 0530  Date of Last Solid: 03/05/24  Time of Last Solid: 1830         Physical Exam    Airway  Mallampati: II  TM distance: >3 FB  Neck ROM: full     Cardiovascular - normal exam     Dental - normal exam     Pulmonary - normal exam     Abdominal - normal exam           Anesthesia Plan    History of general anesthesia?: yes  History of complications of general anesthesia?: no    ASA 2     MAC     The patient is not a current smoker.    Anesthetic plan and risks discussed with patient.  Use of blood products discussed with patient who consented to blood products.    Plan discussed with CAA.

## 2024-03-06 NOTE — DISCHARGE INSTRUCTIONS
After your procedure:  It is normal to experience some pelvic cramping and to have spotting. It would not be normal to have heavy bleeding (soaking through a large pad in an hour). Sometimes your doctor will use medicine on your cervix to help with bleeding. This medicine can cause discharge that looks like coffee grounds and can stain clothing. Please do not place anything in your vagina for 2 weeks.      Call [phone] if you have:  Call for any fever above 100.4 F (If you do not feel feverish you do not have to routinely check your temperature.)  Call for severe pain not improved by medications  Call for persistent nausea, vomiting  Difficulty breathing, headache or visual disturbances  Hives  Persistent dizziness or light-headedness  Extreme fatigue  Any other questions or concerns you may have after discharge    In an emergency, call 911 or go to an Emergency Department at a nearby hospital

## 2024-03-06 NOTE — OP NOTE
Cold knife conization, endocervical curettage, any other indicated procedures Operative Note     Date: 3/6/2024  OR Location: [facility] [place] [facility] OR    Name: Nargis Gill, : 1960, Age: 64 y.o., MRN: [id number], Sex: female    Diagnosis  Pre-op Diagnosis     * Cervical dysplasia [N87.9] Post-op Diagnosis     * Cervical dysplasia [N87.9]     Procedures  Cold knife conization, endocervical curettage, any other indicated procedures  58537 - OK CONIZATION CERVIX W/WO D&C RPR KNIFE/LASER      Surgeons      * Radha Kim - Primary    Resident/Fellow/Other Assistant:  Surgeon(s) and Role:    Procedure Summary  Anesthesia: Monitor Anesthesia Care  ASA: II  Anesthesia Staff: Anesthesiologist: Mima Hackett MD  C-AA: JOSE A Price  MARTI: Du Blanca  Estimated Blood Loss: 25mL  Intra-op Medications:   Administrations occurring from 1030 to 1130 on 24:   Medication Name Total Dose   lactated Ringer's infusion Cannot be calculated              Anesthesia Record               Intraprocedure I/O Totals          Intake    lactated Ringer's infusion 200.00 mL    Total Intake 200 mL          Specimen:   ID Type Source Tests Collected by Time   1 : A. COLD CONE BIOPSY W STITCH AT 12 O'CLOCK Tissue SOFT TISSUE BIOPSY SURGICAL PATHOLOGY EXAM Radha Kim MD 3/6/2024 1028   2 : B. ENDOCERVICAL CURRETTINGS Tissue ENDOCERVIX CURETTINGS SURGICAL PATHOLOGY EXAM Radha Kim MD 3/6/2024 1028        Staff:   Circulator: Digna Salinas RN  Scrub Person: Willi Dasilva RN; Regi Lee         Drains and/or Catheters: * None in log *    Tourniquet Times:         Implants:     Findings: Flush cervix without obvious lesions or masses seen grossly. Normal appearing vagina    Indications: Nargis Gill is an 64 y.o. female who is having surgery for Cervical dysplasia [N87.9].     The patient was seen in the preoperative area. The risks, benefits, complications, treatment options, non-operative  alternatives, expected recovery and outcomes were discussed with the patient. The possibilities of reaction to medication, pulmonary aspiration, injury to surrounding structures, bleeding, recurrent infection, the need for additional procedures, failure to diagnose a condition, and creating a complication requiring transfusion or operation were discussed with the patient. The patient concurred with the proposed plan, giving informed consent.  The site of surgery was properly noted/marked if necessary per policy. The patient has been actively warmed in preoperative area. Preoperative antibiotics are not indicated. Venous thrombosis prophylaxis are not indicated.    Procedure Details:     Consent was completed in pre-op holding area after discussing all risks/benefits/alternatives.   Pt was taken to OR with IV in place. Team huddle and timeout were performed with all appropriate team members.  Anesthesia was induced without difficulty.   Pt was placed in dorsal lithotomy position in migdalia stirrups and prepped and draped in the usual sterile fashion.  A speculum was placed in the vagina and the anterior lip was grasped with a single tooth tenaculum.  The cervix was injected with 10cc of lidocaine. 2 stay sutures were placed with 0-vicryl suture, 1 at 3 oclock and one at 9 oclock.  A scalpel was used to incise the cervix in a typical cone fashion without difficulty,  The base was cut with scissors to detach from the remaining cervix.  Specimen was tagged at 12 oclock.  ECC was then performed.  Bovie rollerball was used to achieve hemostasis.  Monsels solution was applied to the bed of the cone.  Hemostasis was achieved.  The stay sutures were cut to approximately 4cm.      Sponge/lap/needle/instrument counts were correct x 2.      Pt was returned to PACU in stable condition.  She will be discharged home from PACU.    Complications:  None; patient tolerated the procedure well.    Disposition: PACU - hemodynamically  stable.  Condition: stable         Additional Details: None    Attending Attestation: I was present and scrubbed for the entire procedure.    Radha Kim  Phone Number: [phone]

## 2024-03-07 ENCOUNTER — APPOINTMENT (OUTPATIENT)
Dept: PRIMARY CARE | Facility: CLINIC | Age: 64
End: 2024-03-07
Payer: COMMERCIAL

## 2024-03-07 ASSESSMENT — PAIN SCALES - GENERAL: PAINLEVEL_OUTOF10: 2

## 2024-03-09 LAB
CHOLEST SERPL-MCNC: 95 MG/DL (ref 100–199)
HDL SERPL-SCNC: 35.8 UMOL/L
HDLC SERPL-MCNC: 54 MG/DL
LDL SERPL QN: 20.5 NM
LDL SERPL-SCNC: 352 NMOL/L
LDL SMALL SERPL-SCNC: 107 NMOL/L
LDLC SERPL CALC-MCNC: 21 MG/DL (ref 0–99)
LP-IR SCORE SERPL: 39
TRIGL SERPL-MCNC: 113 MG/DL (ref 0–149)

## 2024-03-20 ENCOUNTER — TELEPHONE (OUTPATIENT)
Dept: GYNECOLOGIC ONCOLOGY | Facility: HOSPITAL | Age: 64
End: 2024-03-20

## 2024-03-20 ENCOUNTER — LAB (OUTPATIENT)
Dept: LAB | Facility: LAB | Age: 64
End: 2024-03-20
Payer: COMMERCIAL

## 2024-03-20 ENCOUNTER — APPOINTMENT (OUTPATIENT)
Dept: PRIMARY CARE | Facility: CLINIC | Age: 64
End: 2024-03-20
Payer: COMMERCIAL

## 2024-03-20 DIAGNOSIS — Z98.890 POST-OPERATIVE STATE: ICD-10-CM

## 2024-03-20 DIAGNOSIS — Z98.890 POST-OPERATIVE STATE: Primary | ICD-10-CM

## 2024-03-20 LAB
ERYTHROCYTE [DISTWIDTH] IN BLOOD BY AUTOMATED COUNT: 13 % (ref 11.5–14.5)
HCT VFR BLD AUTO: 44.5 % (ref 36–46)
HGB BLD-MCNC: 14.1 G/DL (ref 12–16)
MCH RBC QN AUTO: 28.7 PG (ref 26–34)
MCHC RBC AUTO-ENTMCNC: 31.7 G/DL (ref 32–36)
MCV RBC AUTO: 90 FL (ref 80–100)
NRBC BLD-RTO: 0 /100 WBCS (ref 0–0)
PLATELET # BLD AUTO: 230 X10*3/UL (ref 150–450)
RBC # BLD AUTO: 4.92 X10*6/UL (ref 4–5.2)
WBC # BLD AUTO: 7.4 X10*3/UL (ref 4.4–11.3)

## 2024-03-20 PROCEDURE — 85027 COMPLETE CBC AUTOMATED: CPT

## 2024-03-20 PROCEDURE — 36415 COLL VENOUS BLD VENIPUNCTURE: CPT

## 2024-03-20 NOTE — TELEPHONE ENCOUNTER
Patient called this morning stating that she is having increased vaginal bleeding that is rylan red blood. She states that she is having some pelvic cramping and saturating one pad every three hours. She said that the increased bleeding began this past Sunday, slowed down Monday and Tuesday but has gone back to the flow of Sunday. Patient states that she is back at work and that she has not lifted anything but is up and down all day.  I advised patient to go to the emergency room if the bleeding increases. I told her I would update her physician and ask for recommendations.

## 2024-03-20 NOTE — PROGRESS NOTES
Patient ID: Nargis Gill is a 64 y.o. female.  Referring Physician: No referring provider defined for this encounter.  Primary Care Provider: Kristina Souza DO      Subjective    HPI:  Nargis Gill is a 64 year old female presenting for cervical dysplasia (CIN3) identified on recent ECC. 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 Pap screenings have been negative since until 12/14/2023 which was c/f HSIL HPV neg and MARIANNE. EMB and colpo 1/12/2024 negative for dysplasia. ECC revealed fragments of CIN3. She denies fever, chills, constipation, diarrhea, vaginal bleeding, abdominal pain, nausea, vomiting, or any other symptoms.    PMH:  Familial hypercholesterolemia  CAD  Psoriasis    PSH:  Cataract surgery     OBHx:  2 SVDs. She is post-menopausal. Denies ABBY or HRT use.    Social:  Denies alcohol, tobacco, and recreational drug use. She lives alone and cares for her 87 year old father. Her two children are both physicians and are a good source of support. She works as a private practice optometrist.    FamHx:  Hx of renal cancer in mother. Otherwise negative for a history of breast, ovarian, uterine, colon, pancreatic, and GI cancer.     Screening:  Cervical cancer: 12/14/2023 c/f HSIL and MARIANNE. HPV negative.  Mammogram:  12/07/2023 negative  Colonoscopy: 2/09/024 1 polyp, path report pending        Objective:  Visit Vitals  /86 (BP Location: Left arm, Patient Position: Sitting, BP Cuff Size: Adult)   Pulse 89   Resp 16       Interval:  Patient is a 64 year old female s/p CKC for cervical dysplasia on 3/6/24.  Called office with below complaints.     Nursing note 3/20/24:  Patient called this morning stating that she is having increased vaginal bleeding that is rylan red blood. She states that she is having some pelvic cramping and saturating one pad every three hours. She said that the increased bleeding began this past Sunday, slowed down Monday and Tuesday but has gone back to the flow  of Sunday. Patient states that she is back at work and that she has not lifted anything but is up and down all day. I advised patient to go to the emergency room if the bleeding increases. I told her I would update her physician and ask for recommendations.     Pathology:  A. CERVIX, COLD KNIFE CONIZATION:   -- Endocervical mucosa and scant transformation zone with no pathologic diagnosis, see note     Note: The specimen comprises predominantly of endocervical mucosa, with squamous mucosa/ transformation zone only present in 2 of 7 sections. Clinical correlation is recommended.     B. ENDOCERVIX, CURETTINGS:   -- Mucus with minute cytologically normal endocervical epithelium    Interval:  Patient states she is still having bleeding, bright red, soaking a pad every few hours.  Denies any pain.  Denies any bowel or bladder issues.      Physical Exam:    Constitutional:   Eyes: PERRL  ENMT: Moist mucus membranes  Head/Neck: Supple. Symmetrical  Genitourinary: CKC excision bed noted with healthy granulation tissue, stay suture on left noted, mild amount of bleeding noted from bed, silver nitrate and pressure applied to area as chemical cauterization without difficulty-patient tolerated well.    Musculoskeletal: ROM intact, no joint swelling, normal strength  Extremities: No edema  Neurological: Alert and oriented x 3. Pleasant and cooperative.  Psychological: Appropriate mood and behavior  Skin: Warm and dry, no lesions, no rashes    A complete review of systems was performed and all systems were normal except what is noted in the interval history.        Assessment/Plan  Patient is a 64 year old female s/p CKC for cervical dysplasia on 3/6/24. Appropriate healing of CKC excision noted.     Plan:  Chemical cauterization of granulation tissue in office  F/U in 1 week as scheduled  Pathology reviewed    Miryam Dejesus, APRN-CNP

## 2024-03-21 ENCOUNTER — OFFICE VISIT (OUTPATIENT)
Dept: GYNECOLOGIC ONCOLOGY | Facility: CLINIC | Age: 64
End: 2024-03-21
Payer: COMMERCIAL

## 2024-03-21 VITALS
HEIGHT: 66 IN | RESPIRATION RATE: 16 BRPM | WEIGHT: 172.84 LBS | SYSTOLIC BLOOD PRESSURE: 136 MMHG | DIASTOLIC BLOOD PRESSURE: 86 MMHG | HEART RATE: 89 BPM | BODY MASS INDEX: 27.78 KG/M2

## 2024-03-21 DIAGNOSIS — N87.9 CERVICAL DYSPLASIA: Primary | ICD-10-CM

## 2024-03-21 PROCEDURE — 17250 CHEM CAUT OF GRANLTJ TISSUE: CPT | Performed by: NURSE PRACTITIONER

## 2024-03-21 PROCEDURE — 99024 POSTOP FOLLOW-UP VISIT: CPT | Performed by: NURSE PRACTITIONER

## 2024-03-21 PROCEDURE — 1036F TOBACCO NON-USER: CPT | Performed by: NURSE PRACTITIONER

## 2024-03-21 ASSESSMENT — PAIN SCALES - GENERAL: PAINLEVEL: 0-NO PAIN

## 2024-03-26 ENCOUNTER — APPOINTMENT (OUTPATIENT)
Dept: GYNECOLOGIC ONCOLOGY | Facility: CLINIC | Age: 64
End: 2024-03-26
Payer: COMMERCIAL

## 2024-03-27 NOTE — PROGRESS NOTES
Patient ID: Nargis Gill is a 64 y.o. female.  Referring Physician: No referring provider defined for this encounter.  Primary Care Provider: Kristina Souza DO      Subjective    Interval History:      Notes improved vaginal bleeding, she notices some increased bleeding with exercise, she took iron supplement 1x and stopped, reports she never used HRT, and tries to get good sleep and has some fatigue.    She denies fever, chills, chest pain, SOB, nausea, vomiting, diarrhea, constipation, dysuria, or any other concerning signs of symptoms.            Nargis Gill is a 64 year old female presenting for cervical dysplasia (CIN3) identified on recent ECC. 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 Pap screenings have been negative since until 12/14/2023 which was c/f HSIL HPV neg and MARIANNE. EMB and colpo 1/12/2024 negative for dysplasia. ECC revealed fragments of CIN3. She denies fever, chills, constipation, diarrhea, vaginal bleeding, abdominal pain, nausea, vomiting, or any other symptoms.    PMH:  Familial hypercholesterolemia  CAD  Psoriasis  Cervical dysplasia     PSH:  Cataract surgery     OBHx:  2 SVDs. She is post-menopausal. Denies ABBY or HRT use.    Social:  Denies alcohol, tobacco, and recreational drug use. She lives alone and cares for her 87 year old father. Her two children are both physicians and are a good source of support. She works as a private practice optometrist.    FamHx:  Hx of renal cancer in mother. Otherwise negative for a history of breast, ovarian, uterine, colon, pancreatic, and GI cancer.     Screening:  Cervical cancer: 12/14/2023 c/f HSIL and MARIANNE. HPV negative.  Mammogram:  12/07/2023 negative  Colonoscopy: 2/09/024 1 polyp, path report pending      Review of Systems - Oncology     Objective   BSA: 1.91 meters squared  /81   Pulse 72   Temp 35.6 °C (96.1 °F) (Core)   Resp 18   Wt 78 kg (171 lb 15.3 oz)   LMP  (LMP Unknown)   SpO2 96%   BMI 27.75  kg/m²      Family History   Problem Relation Name Age of Onset    Kidney cancer Mother Gena Winkler     Cancer Mother Gena Winkler     Other (ANXIETY) Father DIVINE WINKLER     Heart disease Father DIVINE Gill  reports that she has never smoked. She has never used smokeless tobacco.  She  reports that she does not currently use alcohol.  She  reports no history of drug use.    Physical Exam  Constitutional:       General: She is not in acute distress.     Appearance: Normal appearance. She is not toxic-appearing.   HENT:      Head: Normocephalic.      Mouth/Throat:      Mouth: Mucous membranes are moist.      Pharynx: Oropharynx is clear.   Eyes:      Extraocular Movements: Extraocular movements intact.      Conjunctiva/sclera: Conjunctivae normal.      Pupils: Pupils are equal, round, and reactive to light.   Cardiovascular:      Rate and Rhythm: Normal rate and regular rhythm.      Heart sounds: Normal heart sounds. No murmur heard.     No friction rub. No gallop.   Pulmonary:      Effort: Pulmonary effort is normal.      Breath sounds: Normal breath sounds. No wheezing or rhonchi.   Abdominal:      General: Bowel sounds are normal. There is no distension.      Palpations: Abdomen is soft.      Tenderness: There is no abdominal tenderness.   Genitourinary:     Comments: Normal external genitalia no lesions or masses are appreciated  Speculum Exam: Smooth vagina, cold knife cone site with small amount of spotting, appears to be healing well    Musculoskeletal:         General: Normal range of motion.      Cervical back: Normal range of motion.   Skin:     General: Skin is warm.   Neurological:      General: No focal deficit present.      Mental Status: She is alert and oriented to person, place, and time.   Psychiatric:         Mood and Affect: Mood normal.         Behavior: Behavior normal.       Surgical Pathology Exam: [id number]  Order: [id number]  Collected 3/6/2024 10:28       Status: Final  result       Visible to patient: Yes (seen)       Dx: Cervical dysplasia    0 Result Notes      Component    FINAL DIAGNOSIS   A. CERVIX, COLD KNIFE CONIZATION:   -- Endocervical mucosa and scant transformation zone with no pathologic diagnosis, see note     Note: The specimen comprises predominantly of endocervical mucosa, with squamous mucosa/ transformation zone only present in 2 of 7 sections. Clinical correlation is recommended.     B. ENDOCERVIX, CURETTINGS:   -- Mucus with minute cytologically normal endocervical epithelium         Performance Status:  Asymptomatic    Assessment/Plan      Oncology History    No history exists.   Nargis Gill is a 64 year old female with hx of CIN2-3 s/p LEEP in 2014 presenting for cervical dysplasia ARTIS-3, HPV negative, s/p CKC with benign path on 3/6/24    # Cervical dysplasia  - Discussed pathogenesis of HPV related diseases and its relationship to precancers and cancers of the cervix, vagina, vulva, anus, larynx, pharynx  - Reviewed pathology  - Plan for pap/follow up in 6 months          Scribe Attestation  By signing my name below, I, Karley Albright   attest that this documentation has been prepared under the direction and in the presence of Radha Kim MD.     Provider Attestation - Scribe documentation    All medical record entries made by the Scribe were at my direction and personally dictated by me. I have reviewed the chart and agree that the record accurately reflects my personal performance of the history, physical exam, discussion and plan.    Radha Kim MD

## 2024-03-28 ENCOUNTER — OFFICE VISIT (OUTPATIENT)
Dept: GYNECOLOGIC ONCOLOGY | Facility: CLINIC | Age: 64
End: 2024-03-28
Payer: COMMERCIAL

## 2024-03-28 VITALS
OXYGEN SATURATION: 96 % | SYSTOLIC BLOOD PRESSURE: 129 MMHG | BODY MASS INDEX: 27.75 KG/M2 | DIASTOLIC BLOOD PRESSURE: 81 MMHG | TEMPERATURE: 96.1 F | HEART RATE: 72 BPM | RESPIRATION RATE: 18 BRPM | WEIGHT: 171.96 LBS

## 2024-03-28 DIAGNOSIS — R87.613 HGSIL (HIGH GRADE SQUAMOUS INTRAEPITHELIAL LESION) ON PAP SMEAR OF CERVIX: Primary | ICD-10-CM

## 2024-03-28 PROCEDURE — 99024 POSTOP FOLLOW-UP VISIT: CPT | Performed by: STUDENT IN AN ORGANIZED HEALTH CARE EDUCATION/TRAINING PROGRAM

## 2024-03-28 ASSESSMENT — ENCOUNTER SYMPTOMS
DEPRESSION: 0
OCCASIONAL FEELINGS OF UNSTEADINESS: 0
LOSS OF SENSATION IN FEET: 0

## 2024-03-28 ASSESSMENT — PAIN SCALES - GENERAL: PAINLEVEL: 0-NO PAIN

## 2024-04-30 DIAGNOSIS — R19.7 DIARRHEA, UNSPECIFIED TYPE: Primary | ICD-10-CM

## 2024-04-30 RX ORDER — CIPROFLOXACIN 500 MG/1
500 TABLET ORAL 2 TIMES DAILY
Qty: 20 TABLET | Refills: 0 | Status: SHIPPED | OUTPATIENT
Start: 2024-04-30 | End: 2024-05-10

## 2024-06-19 ENCOUNTER — APPOINTMENT (OUTPATIENT)
Dept: DERMATOLOGY | Facility: CLINIC | Age: 64
End: 2024-06-19
Payer: COMMERCIAL

## 2024-08-13 DIAGNOSIS — L40.9 PSORIASIS: Primary | ICD-10-CM

## 2024-08-14 RX ORDER — TRIAMCINOLONE ACETONIDE 0.25 MG/G
OINTMENT TOPICAL
Qty: 45 G | Refills: 0 | Status: SHIPPED | OUTPATIENT
Start: 2024-08-14

## 2024-08-21 ENCOUNTER — OFFICE VISIT (OUTPATIENT)
Dept: DERMATOLOGY | Facility: CLINIC | Age: 64
End: 2024-08-21
Payer: COMMERCIAL

## 2024-08-21 DIAGNOSIS — L40.9 PSORIASIS: Primary | ICD-10-CM

## 2024-08-21 PROCEDURE — 1036F TOBACCO NON-USER: CPT | Performed by: DERMATOLOGY

## 2024-08-21 PROCEDURE — 99214 OFFICE O/P EST MOD 30 MIN: CPT | Performed by: DERMATOLOGY

## 2024-08-21 PROCEDURE — G2211 COMPLEX E/M VISIT ADD ON: HCPCS | Performed by: DERMATOLOGY

## 2024-08-21 RX ORDER — TRIAMCINOLONE ACETONIDE 1 MG/G
OINTMENT TOPICAL 2 TIMES DAILY
Qty: 80 G | Refills: 3 | Status: SHIPPED | OUTPATIENT
Start: 2024-08-21 | End: 2024-09-04

## 2024-08-21 RX ORDER — TRIAMCINOLONE ACETONIDE 1 MG/G
OINTMENT TOPICAL 2 TIMES DAILY
Qty: 80 G | Refills: 3 | Status: SHIPPED | OUTPATIENT
Start: 2024-08-21 | End: 2024-08-21

## 2024-08-21 ASSESSMENT — DERMATOLOGY QUALITY OF LIFE (QOL) ASSESSMENT
RATE HOW BOTHERED YOU ARE BY SYMPTOMS OF YOUR SKIN PROBLEM (EG, ITCHING, STINGING BURNING, HURTING OR SKIN IRRITATION): 3
WHAT SINGLE SKIN CONDITION LISTED BELOW IS THE PATIENT ANSWERING THE QUALITY-OF-LIFE ASSESSMENT QUESTIONS ABOUT: PSORIASIS
RATE HOW BOTHERED YOU ARE BY SYMPTOMS OF YOUR SKIN PROBLEM (EG, ITCHING, STINGING BURNING, HURTING OR SKIN IRRITATION): 2
DATE THE QUALITY-OF-LIFE ASSESSMENT WAS COMPLETED: 67073
ARE THERE EXCLUSIONS OR EXCEPTIONS FOR THE QUALITY OF LIFE ASSESSMENT: NO
RATE HOW BOTHERED YOU ARE BY EFFECTS OF YOUR SKIN PROBLEMS ON YOUR ACTIVITIES (EG, GOING OUT, ACCOMPLISHING WHAT YOU WANT, WORK ACTIVITIES OR YOUR RELATIONSHIPS WITH OTHERS): 2
WHAT SINGLE SKIN CONDITION LISTED BELOW IS THE PATIENT ANSWERING THE QUALITY-OF-LIFE ASSESSMENT QUESTIONS ABOUT: PSORIASIS
RATE HOW BOTHERED YOU ARE BY EFFECTS OF YOUR SKIN PROBLEMS ON YOUR ACTIVITIES (EG, GOING OUT, ACCOMPLISHING WHAT YOU WANT, WORK ACTIVITIES OR YOUR RELATIONSHIPS WITH OTHERS): 3
RATE HOW BOTHERED YOU ARE BY EFFECTS OF YOUR SKIN PROBLEMS ON YOUR ACTIVITIES (EG, GOING OUT, ACCOMPLISHING WHAT YOU WANT, WORK ACTIVITIES OR YOUR RELATIONSHIPS WITH OTHERS): 3
WHAT SINGLE SKIN CONDITION LISTED BELOW IS THE PATIENT ANSWERING THE QUALITY-OF-LIFE ASSESSMENT QUESTIONS ABOUT: PSORIASIS
RATE HOW EMOTIONALLY BOTHERED YOU ARE BY YOUR SKIN PROBLEM (FOR EXAMPLE, WORRY, EMBARRASSMENT, FRUSTRATION): 3
RATE HOW EMOTIONALLY BOTHERED YOU ARE BY YOUR SKIN PROBLEM (FOR EXAMPLE, WORRY, EMBARRASSMENT, FRUSTRATION): 3
DATE THE QUALITY-OF-LIFE ASSESSMENT WAS COMPLETED: 67073
RATE HOW BOTHERED YOU ARE BY SYMPTOMS OF YOUR SKIN PROBLEM (EG, ITCHING, STINGING BURNING, HURTING OR SKIN IRRITATION): 3
RATE HOW EMOTIONALLY BOTHERED YOU ARE BY YOUR SKIN PROBLEM (FOR EXAMPLE, WORRY, EMBARRASSMENT, FRUSTRATION): 3

## 2024-08-21 ASSESSMENT — DERMATOLOGY PATIENT ASSESSMENT
DO YOU HAVE ANY NEW OR CHANGING LESIONS: NO
ARE YOU TRYING TO GET PREGNANT: NO
DO YOU USE A TANNING BED: NO
DO YOU HAVE IRREGULAR MENSTRUAL CYCLES: NO
ARE YOU AN ORGAN TRANSPLANT RECIPIENT: NO
ARE YOU ON BIRTH CONTROL: NO
HAVE YOU HAD OR DO YOU HAVE VASCULAR DISEASE: NO
DO YOU USE SUNSCREEN: OCCASIONALLY
HAVE YOU HAD OR DO YOU HAVE A STAPH INFECTION: NO

## 2024-08-21 ASSESSMENT — PATIENT GLOBAL ASSESSMENT (PGA)
PATIENT GLOBAL ASSESSMENT: PATIENT GLOBAL ASSESSMENT:  2 - MILD
WHAT IS THE PGA: PATIENT GLOBAL ASSESSMENT:  3 - MODERATE

## 2024-08-21 ASSESSMENT — ITCH NUMERIC RATING SCALE: HOW SEVERE IS YOUR ITCHING?: 2

## 2024-08-21 NOTE — PROGRESS NOTES
Subjective     Nargis Gill is a 64 y.o. female who presents for the following: Psoriasis (Flaring trunk, legs, and left elbow. Has been using triamcinolone to the areas.  ).     LCV 2/28/2024. At that visit, she was started on tacrolimus 0.1% ointment twice a day to affected spots. She ran out about two weeks ago but noted onset of a flare before she stopped using it. She currently has active spots on the bottom, hips, bilateral lower legs, and right knee. Her R knee is swollen. She endorses new joint pain a week ago. Does not remember any trauma. She has an appointment on Friday with orthopedics. She has never seen rheumatology before. Denies any other swollen joints. Endorses longstanding significant morning stiffness.    Denies any recent illness. In the past, patient noticed that dental issues would trigger flares. Believes it could be stress.    Review of Systems:  No other skin or systemic complaints other than what is documented elsewhere in the note.    The following portions of the chart were reviewed this encounter and updated as appropriate:       Specialty Problems    None    Past Medical History:  Nargis Gill  has a past medical history of Agatston coronary artery calcium score greater than 400 (12/19/2023), Carcinoma in situ of cervix, unspecified, Coronary artery disease involving native coronary artery of native heart without angina pectoris (12/19/2023), Familial hypercholesteremia (12/19/2023), Moderate cervical dysplasia (12/16/2014), Other specified noninflammatory disorders of vagina, Personal history of other diseases of the circulatory system, Personal history of other diseases of the female genital tract, Personal history of other diseases of the female genital tract, and Personal history of other infectious and parasitic diseases.    Past Surgical History:  Nargis Gill  has a past surgical history that includes Cataract extraction (03/25/2014); Other surgical history (10/14/2021);  Other surgical history (10/14/2021); Other surgical history (05/08/2014); and Colonoscopy.    Family History:  Patient family history includes ANXIETY in her father; Cancer in her mother; Heart disease in her father; Kidney cancer in her mother.    Social History:  Nargis Gill  reports that she has never smoked. She has never used smokeless tobacco. She reports that she does not currently use alcohol. She reports that she does not use drugs.    Allergies:  Patient has no known allergies.    Current Medications / CAM's:    Current Outpatient Medications:     co-enzyme Q-10 30 mg capsule, Take 1 capsule (30 mg) by mouth once daily., Disp: , Rfl:     evolocumab (Repatha SureClick) 140 mg/mL injection, Inject 1 mL (140 mg) under the skin every 14 (fourteen) days., Disp: 6 each, Rfl: 3    multivitamin tablet, Take by mouth., Disp: , Rfl:     rosuvastatin (Crestor) 20 mg tablet, Take 1 tablet (20 mg) by mouth once daily., Disp: 30 tablet, Rfl: 11    aspirin 81 mg chewable tablet, Chew 1 tablet (81 mg) once daily. (Patient not taking: Reported on 3/21/2024), Disp: 30 tablet, Rfl: 11    triamcinolone (Kenalog) 0.1 % ointment, Apply topically 2 times a day for 14 days. Apply to the spot in the gluteal cleft for 1-2 weeks. For the thicker plaques on other areas of the body, apply until lesions are smooth, Disp: 80 g, Rfl: 3     Objective   Well appearing patient in no apparent distress; mood and affect are within normal limits.    A full examination was performed including scalp, head, eyes, ears, nose, lips, neck, chest, axillae, abdomen, back, buttocks, bilateral upper extremities, bilateral lower extremities, hands, feet, fingers, toes, fingernails, toenails, and genitalia. All findings within normal limits unless otherwise noted below.    Erythematous plaques with scale on upper extremities, lower extremities, buttocks, and gluteal cleft. Plaques appear more hyperkeratotic than prior. No areas of atrophy noted  today.  BSA 10+ %                            Assessment/Plan   Psoriasis    Psoriasis Vulgaris, 10% BSA, flaring including sensitive inverse areas such as cleft. Concern for psoriatic arthritis as well.   - At previous visit, transitioned from topical clobetasol to tacrolimus due to concern for steroid atrophy. Now, patient has more generalized and thicker plaques including difficult to treat areas like the gluteal cleft.  - Currently has inflamed right knee for which she plans to see orthopedic surgery on Friday. Endorses some longstanding morning stiffness. Has not had any episodes of dactylitis.  - Given worsening widespread plaques, discussed topical vs systemic treatment options including steroids, topical and oral PDE-4 inhibitors, as well as other biologics. Patient expressed that she is interested in starting injectables.  - Discontinue tacrolimus 0.1% as patient has expanding plaques despite treatment.  - Start triamcinolone 0.1% ointment to the affected parts of the body. Patient was counseled to apply it to the plaque on the gluteal cleft for 2 weeks only. She may apply it to the other plaques until they are smooth  - Once plaques are smooth, she can start roflumilast cream 0.3% once a day to affected areas. If she improves on this medication, we can consider sending prescription at next visit. Samples given.  -Discussed increased risk of heart disease with psoriasis. Encouraged continuing regular checks with her primary care physician. Patient also following a cardiologist currently.   - Start Risankizumab (Skyrizi, IL-23 inh) if baseline labwork ok, check Tspot, HIV, Hepatitis panel, CMP, CBC. Start 150mg SCx1 on wk 0,4, then q12 weeks. Reviewed risks and benefits including but not limited to injection site reactions, recurrent infections including upper respiratory and fungal, headache, fatigue, allergic reactions including severe ones. Patient denies any history of chronic infections. Will complete  prior auth with assistance of  specialty pharmacy  - Discussed seeing a rheumatologist for further management and assessment to determine if patient has psoriatic arthritis. Phone number previously given to patient for scheduling. Recommended that patient schedule a rheumatology visit pending orthopedic evaluation on Friday.  - RTC in 4 months for follow up of psoriasis    Related Procedures  T-SPOT (Tb)  HIV 1/2 Antigen/Antibody Screen with Reflex to Confirmation  Hepatitis B Core Antibody, Igm  Hepatitis B Surface Antigen  Hepatitis B Surface Antibody  Hepatitis C Antibody  Comprehensive metabolic panel  CBC and Auto Differential  Follow Up In Dermatology - Established Patient    Related Medications  triamcinolone (Kenalog) 0.1 % ointment  Apply topically 2 times a day for 14 days. Apply to the spot in the gluteal cleft for 1-2 weeks. For the thicker plaques on other areas of the body, apply until lesions are smooth       RTC in 4 months for follow up of psoriasis.    Cathy Garrett MD  PGY-2, Dermatology    I am the point person for this patients longitudinal, complex care of psoriasis requiring management of systemic medications.     I saw and evaluated the patient. I personally obtained the key and critical portions of the history and physical exam or was physically present for key and critical portions performed by the resident/fellow. I reviewed the resident/fellow's documentation and discussed the patient with the resident/fellow. I agree with the resident/fellow's medical decision making as documented in the note.    Trina Ridley MD

## 2024-08-23 ENCOUNTER — HOSPITAL ENCOUNTER (OUTPATIENT)
Dept: RADIOLOGY | Facility: CLINIC | Age: 64
Discharge: HOME | End: 2024-08-23
Payer: COMMERCIAL

## 2024-08-23 ENCOUNTER — LAB (OUTPATIENT)
Dept: LAB | Facility: LAB | Age: 64
End: 2024-08-23
Payer: COMMERCIAL

## 2024-08-23 ENCOUNTER — OFFICE VISIT (OUTPATIENT)
Dept: ORTHOPEDIC SURGERY | Facility: CLINIC | Age: 64
End: 2024-08-23
Payer: COMMERCIAL

## 2024-08-23 DIAGNOSIS — M17.11 PRIMARY OSTEOARTHRITIS OF RIGHT KNEE: Primary | ICD-10-CM

## 2024-08-23 DIAGNOSIS — M17.11 UNILATERAL PRIMARY OSTEOARTHRITIS, RIGHT KNEE: ICD-10-CM

## 2024-08-23 DIAGNOSIS — L40.9 PSORIASIS: ICD-10-CM

## 2024-08-23 LAB
ALBUMIN SERPL BCP-MCNC: 4.4 G/DL (ref 3.4–5)
ALP SERPL-CCNC: 71 U/L (ref 33–136)
ALT SERPL W P-5'-P-CCNC: 17 U/L (ref 7–45)
ANION GAP SERPL CALC-SCNC: 10 MMOL/L (ref 10–20)
AST SERPL W P-5'-P-CCNC: 19 U/L (ref 9–39)
BASOPHILS # BLD AUTO: 0.05 X10*3/UL (ref 0–0.1)
BASOPHILS NFR BLD AUTO: 1 %
BILIRUB SERPL-MCNC: 0.4 MG/DL (ref 0–1.2)
BUN SERPL-MCNC: 15 MG/DL (ref 6–23)
CALCIUM SERPL-MCNC: 9.8 MG/DL (ref 8.6–10.6)
CHLORIDE SERPL-SCNC: 105 MMOL/L (ref 98–107)
CO2 SERPL-SCNC: 31 MMOL/L (ref 21–32)
CREAT SERPL-MCNC: 0.62 MG/DL (ref 0.5–1.05)
EGFRCR SERPLBLD CKD-EPI 2021: >90 ML/MIN/1.73M*2
EOSINOPHIL # BLD AUTO: 0.21 X10*3/UL (ref 0–0.7)
EOSINOPHIL NFR BLD AUTO: 4.3 %
ERYTHROCYTE [DISTWIDTH] IN BLOOD BY AUTOMATED COUNT: 13 % (ref 11.5–14.5)
GLUCOSE SERPL-MCNC: 100 MG/DL (ref 74–99)
HBV CORE IGM SER QL: NONREACTIVE
HBV SURFACE AB SER-ACNC: 6.5 MIU/ML
HBV SURFACE AG SERPL QL IA: NONREACTIVE
HCT VFR BLD AUTO: 40.7 % (ref 36–46)
HCV AB SER QL: NONREACTIVE
HGB BLD-MCNC: 13.5 G/DL (ref 12–16)
HIV 1+2 AB+HIV1 P24 AG SERPL QL IA: NONREACTIVE
IMM GRANULOCYTES # BLD AUTO: 0.01 X10*3/UL (ref 0–0.7)
IMM GRANULOCYTES NFR BLD AUTO: 0.2 % (ref 0–0.9)
LYMPHOCYTES # BLD AUTO: 1.32 X10*3/UL (ref 1.2–4.8)
LYMPHOCYTES NFR BLD AUTO: 26.9 %
MCH RBC QN AUTO: 28.8 PG (ref 26–34)
MCHC RBC AUTO-ENTMCNC: 33.2 G/DL (ref 32–36)
MCV RBC AUTO: 87 FL (ref 80–100)
MONOCYTES # BLD AUTO: 0.4 X10*3/UL (ref 0.1–1)
MONOCYTES NFR BLD AUTO: 8.1 %
NEUTROPHILS # BLD AUTO: 2.92 X10*3/UL (ref 1.2–7.7)
NEUTROPHILS NFR BLD AUTO: 59.5 %
NRBC BLD-RTO: 0 /100 WBCS (ref 0–0)
PLATELET # BLD AUTO: 201 X10*3/UL (ref 150–450)
POTASSIUM SERPL-SCNC: 4.5 MMOL/L (ref 3.5–5.3)
PROT SERPL-MCNC: 7.1 G/DL (ref 6.4–8.2)
RBC # BLD AUTO: 4.68 X10*6/UL (ref 4–5.2)
SODIUM SERPL-SCNC: 141 MMOL/L (ref 136–145)
WBC # BLD AUTO: 4.9 X10*3/UL (ref 4.4–11.3)

## 2024-08-23 PROCEDURE — 73564 X-RAY EXAM KNEE 4 OR MORE: CPT | Mod: RT

## 2024-08-23 PROCEDURE — 86706 HEP B SURFACE ANTIBODY: CPT

## 2024-08-23 PROCEDURE — 85025 COMPLETE CBC W/AUTO DIFF WBC: CPT

## 2024-08-23 PROCEDURE — 20610 DRAIN/INJ JOINT/BURSA W/O US: CPT | Mod: RT | Performed by: STUDENT IN AN ORGANIZED HEALTH CARE EDUCATION/TRAINING PROGRAM

## 2024-08-23 PROCEDURE — 80053 COMPREHEN METABOLIC PANEL: CPT

## 2024-08-23 PROCEDURE — 99214 OFFICE O/P EST MOD 30 MIN: CPT | Performed by: STUDENT IN AN ORGANIZED HEALTH CARE EDUCATION/TRAINING PROGRAM

## 2024-08-23 PROCEDURE — 86705 HEP B CORE ANTIBODY IGM: CPT

## 2024-08-23 PROCEDURE — 87340 HEPATITIS B SURFACE AG IA: CPT

## 2024-08-23 PROCEDURE — 86803 HEPATITIS C AB TEST: CPT

## 2024-08-23 PROCEDURE — 99204 OFFICE O/P NEW MOD 45 MIN: CPT | Performed by: STUDENT IN AN ORGANIZED HEALTH CARE EDUCATION/TRAINING PROGRAM

## 2024-08-23 PROCEDURE — 2500000004 HC RX 250 GENERAL PHARMACY W/ HCPCS (ALT 636 FOR OP/ED): Performed by: STUDENT IN AN ORGANIZED HEALTH CARE EDUCATION/TRAINING PROGRAM

## 2024-08-23 PROCEDURE — 87389 HIV-1 AG W/HIV-1&-2 AB AG IA: CPT

## 2024-08-23 PROCEDURE — 2500000005 HC RX 250 GENERAL PHARMACY W/O HCPCS: Performed by: STUDENT IN AN ORGANIZED HEALTH CARE EDUCATION/TRAINING PROGRAM

## 2024-08-23 ASSESSMENT — PAIN DESCRIPTION - DESCRIPTORS: DESCRIPTORS: ACHING;SORE

## 2024-08-23 ASSESSMENT — PAIN - FUNCTIONAL ASSESSMENT: PAIN_FUNCTIONAL_ASSESSMENT: 0-10

## 2024-08-23 ASSESSMENT — PAIN SCALES - GENERAL: PAINLEVEL_OUTOF10: 3

## 2024-08-23 NOTE — PROGRESS NOTES
Charisse Werner MD   Adult Reconstruction and Joint Replacement Surgery  Phone: [phone]     Fax: [phone]       Name: Nargis Gill  : 1960 (Age: 64 y.o.)  Date of Visit: 2024        INITIAL CONSULTATION    CC: Right knee pain    Clinical History:  64 y.o. female  who presents with 2 weeks of RIGHT knee pain. They were referred by self. Has been dealing with stressful time.     Patient has tried the following Ice, NSAIDs, Activity modification, Brace , and Xray. Date of last steroid injection: never. Patient does have pain at night. Patient is able to walk several blocks but has pain. Patient is currently using cane as assistive device. Primarily complains of medial pain. Patient has difficulty with walking  and walking on unlevel surfaces, pivoting. The pain is significantly impacting their ability to perform activities of daily living. Patient reports no longer able to do activities such as playing with grandkids. Works as optometrist private.       PROMs/HISTORY   PROMs   No questionnaires on file.     Past Medical History:   Diagnosis Date    Agatston coronary artery calcium score greater than 400 2023    721    Carcinoma in situ of cervix, unspecified     Severe dysplasia of cervix (ARTIS III)    Coronary artery disease involving native coronary artery of native heart without angina pectoris 2023    Familial hypercholesteremia 2023    Moderate cervical dysplasia 2014    ARTIS II (cervical intraepithelial neoplasia II)    Other specified noninflammatory disorders of vagina     Vaginal odor    Personal history of other diseases of the circulatory system     History of varicose veins    Personal history of other diseases of the female genital tract     Vaginal delivery    Personal history of other diseases of the female genital tract     History of vaginal discharge    Personal history of other infectious and parasitic diseases     History of HPV infection       Past  Medical History:   Diagnosis Date    Agatston coronary artery calcium score greater than 400 12/19/2023    721    Carcinoma in situ of cervix, unspecified     Severe dysplasia of cervix (ARTIS III)    Coronary artery disease involving native coronary artery of native heart without angina pectoris 12/19/2023    Familial hypercholesteremia 12/19/2023    Moderate cervical dysplasia 12/16/2014    ARTIS II (cervical intraepithelial neoplasia II)    Other specified noninflammatory disorders of vagina     Vaginal odor    Personal history of other diseases of the circulatory system     History of varicose veins    Personal history of other diseases of the female genital tract     Vaginal delivery    Personal history of other diseases of the female genital tract     History of vaginal discharge    Personal history of other infectious and parasitic diseases     History of HPV infection     Documented in chart and reviewed.     Past Surgical History:   Procedure Laterality Date    CATARACT EXTRACTION  03/25/2014    Cataract Surgery    COLONOSCOPY      OTHER SURGICAL HISTORY  10/14/2021    Cystoscopy    OTHER SURGICAL HISTORY  10/14/2021    Tibia fracture repair    OTHER SURGICAL HISTORY  05/08/2014    Colposcopy Cervix With Biopsy(S)       Allergies: She has No Known Allergies.     Medications:  Current Outpatient Medications   Medication Instructions    aspirin 81 mg, oral, Daily    co-enzyme Q-10 30 mg, oral, Daily    multivitamin tablet oral    Repatha SureClick 140 mg, subcutaneous, Every 14 days    rosuvastatin (CRESTOR) 20 mg, oral, Daily    triamcinolone (Kenalog) 0.1 % ointment Topical, 2 times daily, Apply to the spot in the gluteal cleft for 1-2 weeks. For the thicker plaques on other areas of the body, apply until lesions are smooth       Family History   Problem Relation Name Age of Onset    Kidney cancer Mother Gena Winkler     Cancer Mother Gena Winkler     Other (ANXIETY) Father DIVINE WINKLER     Heart disease Father DIVINE  JUDE      Documented in chart and reviewed.     Social History     Tobacco Use    Smoking status: Never    Smokeless tobacco: Never    Tobacco comments:     second hand smoke/college jobs   Substance Use Topics    Alcohol use: Not Currently     Comment: Rare alcohol use        Review of Systems: Review of systems completed with medical assistant intake. Please refer to this note.     Falls: The patient denies any recent falls or fall-related injuries.    Physical Exam:  BMI: 28, which is normal.     Constitutional: The patient is well-appearing and well groomed.     Neurological/Psychiatric: The patient is alert and oriented to person, place and time. The patient has a normal mood and affect.    Skin Examination: psoriasis lesions over right leg     Cardiovascular Examination: There are no varicosities and the skin is normal temperature, capillary refill normal, arterial pulses normal, no edema.    Lymphatic Examination: There is no lymphatic swelling or palpable lymph nodes present around the involved joint.    Neurological Examination: Bilateral lower extremities are grossly neurologically intact. Sensation normal, motor function normal.    Gait: The patient ambulates with an antalgic gait.     Lumbar spine:    No tenderness to palpation midline.    Negative straight leg raise bilaterally.    Right Hip Examination:  The skin is intact over the hip.    There is no tenderness over the greater trochanter.    Range of motion is full extension to 100 degrees of flexion.    The hip is stable without subluxation or dislocation.    The hip internally rotates to 15 degrees and externally rotates to 45 degrees.    There is no pain with hip motion.    Left Hip Examination:  The skin is intact over the hip.    There is no tenderness over the greater trochanter.    Range of motion is full extension to 100 degrees of flexion.    The hip is stable without subluxation or dislocation.    The hip internally rotates to 15 degrees  "and externally rotates to 45 degrees.    There is no pain with hip motion.    Right Knee Examination:  Examination of the knee reveals the skin to be intact.    There is a moderate effusion in the knee.    The alignment of the knee is Varus.    This deformity is correctable.    There is tenderness to palpation over the joint line.    There is significant quadriceps atrophy.    Range of Motion: 5 to 120 degrees of flexion.    The knee is stable to varus-valgus stress and anterior-posterior stress.     There is moderate grinding with range of motion.    There is moderate patellofemoral crepitus.    Left Knee Examination:  Examination of the knee reveals the skin to be intact.     There is no obvious swelling.    There is a no effusion in the knee.     The alignment of the knee is normal.    There is no tenderness to palpation over the joint line.    There is no significant quadriceps atrophy.    Range of motion is 2 hyper extension to 130 degrees of flexion.    The knee is stable to varus-valgus stress and anterior-posterior stress.     There is no grinding with range of motion.    There is no patellofemoral crepitus.    Prior Labs:   Prior Labs:   Lab Results   Component Value Date    WBC 4.9 08/23/2024    HGB 13.5 08/23/2024    HCT 40.7 08/23/2024    MCV 87 08/23/2024     08/23/2024      No results found for: \"INR\", \"PROTIME\"      Lab Results   Component Value Date    GLUCOSE 100 (H) 08/23/2024    CALCIUM 9.8 08/23/2024     08/23/2024    K 4.5 08/23/2024    CO2 31 08/23/2024     08/23/2024    BUN 15 08/23/2024    CREATININE 0.62 08/23/2024      No results found for: \"CKTOTAL\", \"CKMB\", \"CKMBINDEX\", \"TROPONINI\"   Lab Results   Component Value Date    HGBA1C 6.0 (A) 10/30/2021         No results found for: \"CRP\"   No results found for: \"SEDRATE\"      Radiographs:  Radiographs were personally reviewed today. There is evidence of mild  RIGHT  knee osteoarthritis without MEDIAL bone on bone " apposition.    Impression:  64 y.o. female  who presents with mild  RIGHT  knee osteoarthritis without bone on bone apposition. The patient is not a candidate for surgery at this time.    Diagnosis:  Primary osteoarthritis of right knee     Recommendations / Plan:    I have discussed the options in detail with the patient. We have discussed anti-inflammatory medication, activity modification, physical therapy, corticosteroid injections, viscosupplementation injections, partial knee replacement surgery and total knee replacement surgery. The patient has not yet exhausted all conservative treatment measures.    The risks and benefits of all these treatment options have been discussed in detail.     A physical therapy prescription was ordered for the patient.  Patient will continue their home exercise program. Strategies for pain management using over-the-counter anti-inflammatory medications reviewed - ibuprofen, advil reviewed.  The patient elects for a steroid injection, which was provided according to procedure note below. The patient was fit for a brace today. Encouraged them to maintain range of motion and strength around the knee joints.  They will continue to implement these strategies in addressing their pain.       Recommend the patient continue optimizing nonsurgical treatment interventions as outlined above for management of their knee arthritis.  I would be happy to see them again at any point to discuss surgery if they are more optimized or to review progress with nonsurgical treatment of arthritis. The patient verbalizes understanding with the recommendations and treatment plan as outlined above and is in agreement.  Questions were addressed.    Large Joint Injection 11071: Knee  Consent given by: Patient  Timeout: Immediately prior to procedure the correct patient, procedure, and site was verified. Sterile gloves and semi-sterile technique were used.   Indications: Knee pain and joint swelling.    Location: RIGHT knee  Needle size: 22 G  Approach: Lateral    Medications administered: please see medical assistant note for lot number and expiration date.   Subcutaneous   4 ml of 1% lidocaine     Deep   4 ml of 1% lidocaine   4 mL 0.5% marcaine   2 mL of 40 mg kenalog     Patient tolerance: Dressing applied. Patient tolerated the procedure well with no immediate complications.    L Inj/Asp: R knee on 8/23/2024 12:14 PM  Indications: pain and joint swelling  Details: 22 G needle, lateral approach  Medications: 80 mg triamcinolone acetonide 40 mg/mL; 8 mL lidocaine 10 mg/mL (1 %); 4 mL BUPivacaine HCl 0.5 % (5 mg/mL)  Outcome: tolerated well, no immediate complications  Procedure, treatment alternatives, risks and benefits explained, specific risks discussed. Consent was given by the patient. Immediately prior to procedure a time out was called to verify the correct patient, procedure, equipment, support staff and site/side marked as required. Patient was prepped and draped in the usual sterile fashion.           _____________  Charisse Werner MD   Attending Orthopaedic Surgeon  [facility]    [facility]    Approximately 45 minutes were spent on the following tasks:              Preparing for the patient              Reviewing medical records              Taking a patient history              Performing a physical exam              Reviewing treatment options with the patient              Explaining the risks, potential benefits, and alternative to surgery  Explaining the expected rehabilitation after each treatment option  Explaining the potential long term expectations  Evaluating the diagnostic imaging     This office note was transcribed with dictation software.  Please excuse any typographical errors, program misunderstandings leading to inadvertent insertions or deletions of inappropriate wording, pronoun errors and other unintentional transcription  errors not noticed on proof-reading.

## 2024-08-24 RX ORDER — LIDOCAINE HYDROCHLORIDE 10 MG/ML
8 INJECTION INFILTRATION; PERINEURAL
Status: COMPLETED | OUTPATIENT
Start: 2024-08-23 | End: 2024-08-23

## 2024-08-24 RX ORDER — TRIAMCINOLONE ACETONIDE 40 MG/ML
80 INJECTION, SUSPENSION INTRA-ARTICULAR; INTRAMUSCULAR
Status: COMPLETED | OUTPATIENT
Start: 2024-08-23 | End: 2024-08-23

## 2024-08-24 RX ORDER — BUPIVACAINE HYDROCHLORIDE 5 MG/ML
4 INJECTION, SOLUTION PERINEURAL
Status: COMPLETED | OUTPATIENT
Start: 2024-08-23 | End: 2024-08-23

## 2024-08-25 LAB
NIL(NEG) CONTROL SPOT COUNT: NORMAL
PANEL A SPOT COUNT: 0
PANEL B SPOT COUNT: 0
POS CONTROL SPOT COUNT: NORMAL
T-SPOT. TB INTERPRETATION: NEGATIVE

## 2024-08-26 DIAGNOSIS — L40.9 PSORIASIS: Primary | ICD-10-CM

## 2024-08-27 ENCOUNTER — SPECIALTY PHARMACY (OUTPATIENT)
Dept: PHARMACY | Facility: CLINIC | Age: 64
End: 2024-08-27

## 2024-08-30 ENCOUNTER — SPECIALTY PHARMACY (OUTPATIENT)
Dept: PHARMACY | Facility: CLINIC | Age: 64
End: 2024-08-30

## 2024-08-30 PROCEDURE — RXMED WILLOW AMBULATORY MEDICATION CHARGE

## 2024-08-31 ENCOUNTER — PHARMACY VISIT (OUTPATIENT)
Dept: PHARMACY | Facility: CLINIC | Age: 64
End: 2024-08-31
Payer: COMMERCIAL

## 2024-09-04 ENCOUNTER — SPECIALTY PHARMACY (OUTPATIENT)
Dept: PHARMACY | Facility: CLINIC | Age: 64
End: 2024-09-04

## 2024-09-04 NOTE — PROGRESS NOTES
[facility] Clinical Note    Nargis Gill is a 64 y.o. female, who is on the specialty pharmacy service for management of:  Dermatology Core.    Nargis Gill is taking: Skyrizi.    Medication Receipt Date: 9/4/24  Medication Start Date (planned or actual): plan for 9/4/24    Nargis was contacted on 9/4/2024 at 1:33 PM for a virtual pharmacy visit with encounter number [id number] from:   [facility]  [address] [place] [id number]  Dept: [phone]  Dept Fax: [phone]    Nargis was offered a Telemedicine Video visit or Telephone visit.  Nargis consented to a telephone visit, which was performed.    The most recent encounter visit with the referring prescriber Trina Ridley MD  on 8/21/24  was reviewed.  Pharmacy will continue to collaborate in the care of this patient with the referring prescriber Trina Ridley MD .    General Assessment      Impression/Plan  IMPRESSION/PLAN:  Is patient high risk (potential patients:  pregnancy, geriatric, pediatric)?  no  Is laboratory follow-up needed? no  Is a clinical intervention needed? no  Next reassessment date? ~4 months  Additional comments:     Refer to the encounter summary report for documentation details about patient counseling and education.      Medication Adherence    The importance of adherence was discussed with the patient and they were advised to take the medication as prescribed by their provider. Patient was encouraged to call their physician's office if they have a question regarding a missed dose.        Patient was advised to contact the pharmacy if there are any changes to their medication list, including prescriptions, OTC medications, herbal products, or supplements. Patient was advised of [facility]'s dispensing process, refill timeline, contact information ([phone]), and patient management follow up. Patient confirmed understanding of education  conducted during assessment. All patient questions and concerns were addressed to the best of my ability. Patient was encouraged to contact the specialty pharmacy with any questions or concerns.    Confirmed follow-up outreaches are properly scheduled and reviewed goals of therapy with the patient.        JMI TONEY, NithinD

## 2024-09-11 ENCOUNTER — APPOINTMENT (OUTPATIENT)
Dept: DERMATOLOGY | Facility: CLINIC | Age: 64
End: 2024-09-11
Payer: COMMERCIAL

## 2024-09-13 ENCOUNTER — OFFICE VISIT (OUTPATIENT)
Dept: ORTHOPEDIC SURGERY | Facility: CLINIC | Age: 64
End: 2024-09-13
Payer: COMMERCIAL

## 2024-09-13 DIAGNOSIS — M17.11 PRIMARY OSTEOARTHRITIS OF RIGHT KNEE: Primary | ICD-10-CM

## 2024-09-13 PROCEDURE — 99213 OFFICE O/P EST LOW 20 MIN: CPT | Performed by: STUDENT IN AN ORGANIZED HEALTH CARE EDUCATION/TRAINING PROGRAM

## 2024-09-13 PROCEDURE — L1812 KO ELASTIC W/JOINTS PRE OTS: HCPCS | Performed by: STUDENT IN AN ORGANIZED HEALTH CARE EDUCATION/TRAINING PROGRAM

## 2024-09-13 ASSESSMENT — PAIN SCALES - GENERAL: PAINLEVEL_OUTOF10: 8

## 2024-09-13 ASSESSMENT — PAIN - FUNCTIONAL ASSESSMENT: PAIN_FUNCTIONAL_ASSESSMENT: 0-10

## 2024-09-13 ASSESSMENT — PAIN DESCRIPTION - DESCRIPTORS: DESCRIPTORS: THROBBING;ACHING;SHARP

## 2024-09-13 NOTE — PROGRESS NOTES
Charisse Werner MD   Adult Reconstruction and Joint Replacement Surgery  Phone: [phone]     Fax: [phone]       Name: Nargis Gill  Age: 64 y.o.   : 1960   Date of Visit: 2024    Follow-up Knee    CC: Follow-up RIGHT knee     History of Present Illness:  This patient presents with several weeks of RIGHT knee pain.     They were last seen for this problem on 24. At the last visit, the patient was given an injection.  She was also encouraged to reinitiate physical therapy which she has done.  The patient has been experimenting with braces (soft with metal reinforcements). Injection did seem to help but still has some pain. Has taken advil intermittently.     Patient has tried the following Ice, NSAIDs, Activity modification, corticosteroid injection, brace , and Xray. Date of last steroid injection: 2024. Patient does have pain at night. Patient is able to walk several blocks but has pain. Patient is currently using cane as assistive device. Primarily complains of medial pain. Patient has difficulty with walking  and walking on unlevel surfaces, pivoting. The pain is significantly impacting their ability to perform activities of daily living. Patient reports no longer able to do activities such as playing with grandkids. Works as optometrist private.     HISTORY  PROMs   No questionnaires on file.     Past Medical History:   Diagnosis Date    Agatston coronary artery calcium score greater than 400 2023    721    Carcinoma in situ of cervix, unspecified     Severe dysplasia of cervix (ARTIS III)    Coronary artery disease involving native coronary artery of native heart without angina pectoris 2023    Familial hypercholesteremia 2023    Moderate cervical dysplasia 2014    ARTIS II (cervical intraepithelial neoplasia II)    Other specified noninflammatory disorders of vagina     Vaginal odor    Personal history of other diseases of the circulatory system      History of varicose veins    Personal history of other diseases of the female genital tract     Vaginal delivery    Personal history of other diseases of the female genital tract     History of vaginal discharge    Personal history of other infectious and parasitic diseases     History of HPV infection       Past Medical History:   Diagnosis Date    Agatston coronary artery calcium score greater than 400 12/19/2023    721    Carcinoma in situ of cervix, unspecified     Severe dysplasia of cervix (ARTIS III)    Coronary artery disease involving native coronary artery of native heart without angina pectoris 12/19/2023    Familial hypercholesteremia 12/19/2023    Moderate cervical dysplasia 12/16/2014    ARTIS II (cervical intraepithelial neoplasia II)    Other specified noninflammatory disorders of vagina     Vaginal odor    Personal history of other diseases of the circulatory system     History of varicose veins    Personal history of other diseases of the female genital tract     Vaginal delivery    Personal history of other diseases of the female genital tract     History of vaginal discharge    Personal history of other infectious and parasitic diseases     History of HPV infection     Documented in chart and reviewed.     Past Surgical History:   Procedure Laterality Date    CATARACT EXTRACTION  03/25/2014    Cataract Surgery    COLONOSCOPY      OTHER SURGICAL HISTORY  10/14/2021    Cystoscopy    OTHER SURGICAL HISTORY  10/14/2021    Tibia fracture repair    OTHER SURGICAL HISTORY  05/08/2014    Colposcopy Cervix With Biopsy(S)       Allergies: She has No Known Allergies.     Medications:  Current Outpatient Medications   Medication Instructions    aspirin 81 mg, oral, Daily    co-enzyme Q-10 30 mg, oral, Daily    multivitamin tablet oral    Repatha SureClick 140 mg, subcutaneous, Every 14 days    risankizumab-rzaa (Skyrizi) 150 mg/mL pen injector pen Inject 1 mL (150 mg) under the skin every 28 days (at week 0 and  week 4) as directed    risankizumab-rzaa (SKYRIZI) 150 mg, subcutaneous, every 12 weeks, Start 12 weeks after completion of your first two injections.    rosuvastatin (CRESTOR) 20 mg, oral, Daily       Family History   Problem Relation Name Age of Onset    Kidney cancer Mother Gena Winkler     Cancer Mother Gena Winkler     Other (ANXIETY) Father DIVINE WINKLER     Heart disease Father DIVINE WINKLER      Documented in chart and reviewed.     Social History     Tobacco Use    Smoking status: Never    Smokeless tobacco: Never    Tobacco comments:     second hand smoke/college jobs   Substance Use Topics    Alcohol use: Not Currently     Comment: Rare alcohol use        Review of Systems: Review of systems completed with medical assistant intake. Please refer to this note.     Physical Exam:  BMI: 28.    General: The patient is well appearing and has an appropriate affect.     Neurological Examination: SILT in SPN/DPN/Sural/Saphenous/Tibial nerves. 5/5 EHL, FHL, Tibial anterior, Gastrocnemius. Coordination grossly intact.     Cardiovascular Exam: Capillary refill <2 seconds. No edema. No varicose veins.     Lymphatic Examination: There is no obvious lymphatic swelling present around the involved joint.    Skin Exam: Skin around the pertinent joint is without evidence of infection or rash.    Gait: patient ambulates with antalgic gait.    Right Hip Examination:  Examination of the hip reveals the skin to be intact.     There is no tenderness over the greater trochanter.    Range of motion is full extension to 100 degrees of flexion.    The hip is stable without subluxation or dislocation.    The hip internally rotates to 15 degrees and externally rotates to 45 degrees.    There is no pain with hip motion.    Left Hip Examination:  Examination of the hip reveals the skin to be intact.     There is no tenderness over the greater trochanter.    Range of motion is full extension to 100 degrees of flexion.    The hip is stable  without subluxation or dislocation.    The hip internally rotates to 15 degrees and externally rotates to 45 degrees.    There is no pain with hip motion.    Right knee Examination:  Examination of the left knee reveals the skin to be intact.  Small plaques from known psoriasis.    There is a small effusion in the knee.    The alignment of the knee is Varus.    This deformity is correctable.    There is tenderness to palpation over the joint line.    There is significant quadriceps atrophy.    Range of Motion: full extension to 120 degrees of flexion.    The knee is stable to varus-valgus stress and anterior-posterior stress.     There is mild grinding with range of motion.    There is mild patellofemoral crepitus.    Left knee Examination:  Examination of the left knee reveals the skin to be intact.    There is no effusion in the knee.    The alignment of the knee is neutral.    This deformity is correctable.    There is tenderness to palpation over the joint line.    There is significant quadriceps atrophy.    Range of Motion: full extension to 120 degrees of flexion.    The knee is stable to varus-valgus stress and anterior-posterior stress.     There is no grinding with range of motion.    There is no patellofemoral crepitus.    Imaging:  Radiographs were personally reviewed today. There is evidence of mild  RIGHT  knee osteoarthritis without MEDIAL bone on bone apposition.  There is also mild patellofemoral compartment degenerative change without bone-on-bone apposition.    Impression and Plan:  This patient is here for follow-up evaluation of RIGHT knee.    DIAGNOSIS  Primary osteoarthritis of right knee     I have discussed the options in detail with the patient. We have discussed anti-inflammatory medication, activity modification, physical therapy, corticosteroid injections, viscosupplementation injections, partial knee replacement surgery and total knee replacement surgery.  There is no indication for  surgery at this time.    The risks and benefits of all these treatment options have been discussed in detail.     Currently their BMI is 28.  Discussed that obesity is a risk factor for continued progression of osteoarthritis. Each pound of weight loss offloads their hip and knee joints by 3-6 pounds.  The most effective of these options is weight loss mainly through restricting caloric intake.  She will continue with her fitness and nutrition efforts.    Will continue physical therapy. Patient will continue their home exercise program. Strategies for pain management using over-the-counter anti-inflammatory medications reviewed. Will continue Advil as needed. Too soon for another steroid injection.  She may pursue these every 3 months or longer depending on how the effects last.  Patient was fit for a medial  knee brace today to be used with high-impact activities.    Encouraged them to maintain range of motion and strength around the knee joints.  They will continue to implement these strategies in addressing their pain.      Recommend the patient continue optimizing nonsurgical treatment interventions as outlined above for management of their knee arthritis.  I would be happy to see them again at any point to discuss surgery if they are more optimized or to review progress with nonsurgical treatment of arthritis. The patient verbalizes understanding with the recommendations and treatment plan as outlined above and is in agreement.  Questions were addressed.    RTC: As needed    X-rays at next visit: As needed    _____________________  Charisse Werner MD   Attending Orthopaedic Surgeon  [facility]    [facility]    This office note was transcribed with dictation software.  Please excuse any typographical errors, program misunderstandings leading to inadvertent insertions or deletions of inappropriate wording, pronoun errors and other unintentional  transcription errors not noticed on proof-reading.

## 2024-09-23 ENCOUNTER — PHARMACY VISIT (OUTPATIENT)
Dept: PHARMACY | Facility: CLINIC | Age: 64
End: 2024-09-23
Payer: COMMERCIAL

## 2024-09-23 ENCOUNTER — APPOINTMENT (OUTPATIENT)
Dept: ORTHOPEDIC SURGERY | Facility: HOSPITAL | Age: 64
End: 2024-09-23
Payer: COMMERCIAL

## 2024-09-23 ENCOUNTER — SPECIALTY PHARMACY (OUTPATIENT)
Dept: PHARMACY | Facility: CLINIC | Age: 64
End: 2024-09-23

## 2024-09-23 PROCEDURE — RXMED WILLOW AMBULATORY MEDICATION CHARGE

## 2024-09-25 ENCOUNTER — SPECIALTY PHARMACY (OUTPATIENT)
Dept: PHARMACY | Facility: CLINIC | Age: 64
End: 2024-09-25

## 2024-09-25 NOTE — PROGRESS NOTES
Patient ID: Nargis Gill is a 64 y.o. female.  Referring Physician: No referring provider defined for this encounter.  Primary Care Provider: Kristina Souza DO      Subjective    Interval History:    Expressed she is overworked, doesn't have enough help, getting to [facility] age, poor sleep, she was rushing around her office and has a brace with a condyle and doing PT, otherwise is tired, bowel movements, bladder and appetite are normal and denies abnormal vaginal bleeding or discharge.    She denies fever, chills, chest pain, SOB, nausea, vomiting, diarrhea, constipation, dysuria, or any other concerning signs of symptoms.            Nargis Gill is a 64 year old female presenting for cervical dysplasia (CIN3) identified on recent ECC. 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 Pap screenings have been negative since until 12/14/2023 which was c/f HSIL HPV neg and MARIANNE. EMB and colpo 1/12/2024 negative for dysplasia. ECC revealed fragments of CIN3. She denies fever, chills, constipation, diarrhea, vaginal bleeding, abdominal pain, nausea, vomiting, or any other symptoms.    PMH:  Familial hypercholesterolemia  CAD  Psoriasis  Cervical dysplasia     PSH:  Cataract surgery     OBHx:  2 SVDs. She is post-menopausal. Denies ABBY or HRT use.    Social:  Denies alcohol, tobacco, and recreational drug use. She lives alone and cares for her 87 year old father. Her two children are both physicians and are a good source of support. She works as a private practice optometrist.    FamHx:  Hx of renal cancer in mother. Otherwise negative for a history of breast, ovarian, uterine, colon, pancreatic, and GI cancer.     Screening:  Cervical cancer: 12/14/2023 c/f HSIL and MARIANNE. HPV negative.  Mammogram:  12/07/2023 negative  Colonoscopy: 2/09/024 1 polyp, path report pending      Review of Systems - Oncology     Objective   BSA: 1.9 meters squared  /65   Pulse 62   Temp 36.1 °C (97 °F) (Core)    Resp 18   Wt 77.6 kg (171 lb)   LMP  (LMP Unknown)   SpO2 98%   BMI 27.60 kg/m²      Family History   Problem Relation Name Age of Onset    Kidney cancer Mother Gena Winkler     Cancer Mother Gena Winkler     Other (ANXIETY) Father DIVINE WINKLER     Heart disease Father DIVINE Gill  reports that she has never smoked. She has never used smokeless tobacco.  She  reports that she does not currently use alcohol.  She  reports no history of drug use.    Physical Exam  Constitutional:       General: She is not in acute distress.     Appearance: Normal appearance. She is not toxic-appearing.   HENT:      Head: Normocephalic.      Mouth/Throat:      Mouth: Mucous membranes are moist.      Pharynx: Oropharynx is clear.   Eyes:      Extraocular Movements: Extraocular movements intact.      Conjunctiva/sclera: Conjunctivae normal.      Pupils: Pupils are equal, round, and reactive to light.   Cardiovascular:      Rate and Rhythm: Normal rate and regular rhythm.      Heart sounds: Normal heart sounds. No murmur heard.     No friction rub. No gallop.   Pulmonary:      Effort: Pulmonary effort is normal.      Breath sounds: Normal breath sounds. No wheezing or rhonchi.   Abdominal:      General: Bowel sounds are normal. There is no distension.      Palpations: Abdomen is soft.      Tenderness: There is no abdominal tenderness.   Genitourinary:     Comments: Normal external genitalia no lesions or masses are appreciated  Speculum Exam: Smooth vagina, flush cervix, no discernable os, small site of granulation tissue at apex of vagina. Pap collected     Musculoskeletal:         General: Normal range of motion.      Cervical back: Normal range of motion.   Skin:     General: Skin is warm.   Neurological:      General: No focal deficit present.      Mental Status: She is alert and oriented to person, place, and time.   Psychiatric:         Mood and Affect: Mood normal.         Behavior: Behavior normal.        Surgical Pathology Exam: [id number]  Order: [id number]  Collected 3/6/2024 10:28       Status: Final result       Visible to patient: Yes (seen)       Dx: Cervical dysplasia    0 Result Notes      Component    FINAL DIAGNOSIS   A. CERVIX, COLD KNIFE CONIZATION:   -- Endocervical mucosa and scant transformation zone with no pathologic diagnosis, see note     Note: The specimen comprises predominantly of endocervical mucosa, with squamous mucosa/ transformation zone only present in 2 of 7 sections. Clinical correlation is recommended.     B. ENDOCERVIX, CURETTINGS:   -- Mucus with minute cytologically normal endocervical epithelium         Performance Status:  Asymptomatic    Assessment/Plan      Oncology History    No history exists.   Nargis Gill is a 64 year old female with hx of CIN2-3 s/p LEEP in 2014 presenting for cervical dysplasia ARTIS-3, HPV negative, s/p CKC with benign path on 3/6/24    # Cervical dysplasia  - Discussed pathogenesis of HPV related diseases and its relationship to precancers and cancers of the cervix, vagina, vulva, anus, larynx, pharynx  - Reviewed pathology  - Pap collected today, will call with results   - Plan for pap/follow up in 6 months        Scribe Attestation  By signing my name below, I, Karley Albright   attest that this documentation has been prepared under the direction and in the presence of Radha Kim MD.     Provider Attestation - Scribe documentation    All medical record entries made by the Scribe were at my direction and personally dictated by me. I have reviewed the chart and agree that the record accurately reflects my personal performance of the history, physical exam, discussion and plan.    Radha Kim MD

## 2024-09-26 ENCOUNTER — OFFICE VISIT (OUTPATIENT)
Dept: GYNECOLOGIC ONCOLOGY | Facility: CLINIC | Age: 64
End: 2024-09-26
Payer: COMMERCIAL

## 2024-09-26 VITALS
WEIGHT: 171 LBS | BODY MASS INDEX: 27.6 KG/M2 | DIASTOLIC BLOOD PRESSURE: 65 MMHG | TEMPERATURE: 97 F | OXYGEN SATURATION: 98 % | HEART RATE: 62 BPM | RESPIRATION RATE: 18 BRPM | SYSTOLIC BLOOD PRESSURE: 130 MMHG

## 2024-09-26 DIAGNOSIS — N87.9 CERVICAL DYSPLASIA: Primary | ICD-10-CM

## 2024-09-26 PROCEDURE — 99214 OFFICE O/P EST MOD 30 MIN: CPT | Performed by: STUDENT IN AN ORGANIZED HEALTH CARE EDUCATION/TRAINING PROGRAM

## 2024-09-26 PROCEDURE — 1036F TOBACCO NON-USER: CPT | Performed by: STUDENT IN AN ORGANIZED HEALTH CARE EDUCATION/TRAINING PROGRAM

## 2024-09-26 ASSESSMENT — PAIN SCALES - GENERAL: PAINLEVEL: 0-NO PAIN

## 2024-10-14 ENCOUNTER — TELEPHONE (OUTPATIENT)
Dept: GYNECOLOGIC ONCOLOGY | Facility: HOSPITAL | Age: 64
End: 2024-10-14
Payer: COMMERCIAL

## 2024-10-14 LAB
CYTOLOGY CMNT CVX/VAG CYTO-IMP: NORMAL
HPV HR 12 DNA GENITAL QL NAA+PROBE: NEGATIVE
HPV HR GENOTYPES PNL CVX NAA+PROBE: NEGATIVE
HPV16 DNA SPEC QL NAA+PROBE: NEGATIVE
HPV18 DNA SPEC QL NAA+PROBE: NEGATIVE
LAB AP HPV GENOTYPE QUESTION: YES
LAB AP HPV HR: NORMAL
LAB AP PREVIOUS ABNORMAL HISTORY: NORMAL
LAB AP TREATMENT HISTORY: NORMAL
LABORATORY COMMENT REPORT: NORMAL
PATH REPORT.TOTAL CANCER: NORMAL

## 2024-10-14 NOTE — TELEPHONE ENCOUNTER
Phoned patient to notify that pap results and negative and Dr. Kim recommends follow up in 6 months.   Notified patient that she is scheduled to see Dr. Kim on 4/3/25 at 1:40 at the [facility] location.   Message left on patient voice mail.

## 2024-11-21 ENCOUNTER — SPECIALTY PHARMACY (OUTPATIENT)
Dept: PHARMACY | Facility: CLINIC | Age: 64
End: 2024-11-21

## 2024-11-26 PROCEDURE — RXMED WILLOW AMBULATORY MEDICATION CHARGE

## 2024-11-29 ENCOUNTER — PHARMACY VISIT (OUTPATIENT)
Dept: PHARMACY | Facility: CLINIC | Age: 64
End: 2024-11-29
Payer: COMMERCIAL

## 2024-12-05 ENCOUNTER — APPOINTMENT (OUTPATIENT)
Dept: PRIMARY CARE | Facility: CLINIC | Age: 64
End: 2024-12-05
Payer: COMMERCIAL

## 2024-12-05 DIAGNOSIS — Z12.31 SCREENING MAMMOGRAM FOR BREAST CANCER: ICD-10-CM

## 2024-12-05 DIAGNOSIS — E78.00 PURE HYPERCHOLESTEROLEMIA: Primary | ICD-10-CM

## 2024-12-05 DIAGNOSIS — G47.33 OBSTRUCTIVE SLEEP APNEA SYNDROME: ICD-10-CM

## 2024-12-05 PROBLEM — G43.909 MIGRAINE HEADACHE: Status: ACTIVE | Noted: 2024-12-05

## 2024-12-05 PROBLEM — S62.009A: Status: ACTIVE | Noted: 2023-09-14

## 2024-12-05 PROBLEM — Z86.79 HISTORY OF VARICOSE VEINS: Status: ACTIVE | Noted: 2024-12-05

## 2024-12-05 PROBLEM — E78.5 HYPERLIPIDEMIA: Status: ACTIVE | Noted: 2024-12-05

## 2024-12-05 PROBLEM — R53.83 FATIGUE: Status: ACTIVE | Noted: 2024-12-05

## 2024-12-05 PROBLEM — S62.133A: Status: ACTIVE | Noted: 2023-09-14

## 2024-12-05 PROBLEM — Z86.19 HISTORY OF INFECTION DUE TO HUMAN PAPILLOMA VIRUS (HPV): Status: ACTIVE | Noted: 2024-12-05

## 2024-12-05 PROBLEM — N89.8 VAGINAL ODOR: Status: ACTIVE | Noted: 2024-12-05

## 2024-12-05 PROBLEM — N91.2 AMENORRHEA: Status: ACTIVE | Noted: 2024-12-05

## 2024-12-05 PROBLEM — R59.9 ADENOPATHY: Status: ACTIVE | Noted: 2024-12-05

## 2024-12-05 PROBLEM — E78.5 HYPERLIPEMIA: Status: ACTIVE | Noted: 2024-12-05

## 2024-12-05 PROBLEM — L23.7 CONTACT DERMATITIS DUE TO POISON IVY: Status: ACTIVE | Noted: 2024-12-05

## 2024-12-05 PROBLEM — L40.9 PSORIASIS: Status: ACTIVE | Noted: 2024-12-05

## 2024-12-05 PROBLEM — E55.9 VITAMIN D DEFICIENCY: Status: ACTIVE | Noted: 2024-12-05

## 2024-12-05 PROBLEM — S62.113A: Status: ACTIVE | Noted: 2023-09-14

## 2024-12-05 PROBLEM — S62.143A: Status: ACTIVE | Noted: 2023-09-14

## 2024-12-05 PROCEDURE — 1036F TOBACCO NON-USER: CPT | Performed by: FAMILY MEDICINE

## 2024-12-05 PROCEDURE — 99213 OFFICE O/P EST LOW 20 MIN: CPT | Performed by: FAMILY MEDICINE

## 2024-12-05 RX ORDER — HEPATITIS A AND HEPATITIS B (RECOMBINANT) VACCINE 720; 20 [IU]/ML; UG/ML
INJECTION, SUSPENSION INTRAMUSCULAR
COMMUNITY
Start: 2024-05-31

## 2024-12-05 RX ORDER — TRIAMCINOLONE ACETONIDE 1 MG/G
OINTMENT TOPICAL
COMMUNITY
Start: 2024-11-16

## 2024-12-05 ASSESSMENT — PATIENT HEALTH QUESTIONNAIRE - PHQ9
1. LITTLE INTEREST OR PLEASURE IN DOING THINGS: NOT AT ALL
2. FEELING DOWN, DEPRESSED OR HOPELESS: NOT AT ALL
SUM OF ALL RESPONSES TO PHQ9 QUESTIONS 1 AND 2: 0

## 2024-12-05 NOTE — PROGRESS NOTES
Subjective   Patient ID: Nargis Gill is a 64 y.o. female who presents for Annual Exam.    Hyperlipidemia     Virtual or Telephone Consent  Virtual or Telephone Consent  Chief Complaint_[facility]:  An interactive audio and video telecommunication system which permits real time communications between the patient (at the originating site) and provider (at the distant site) was utilized to provide this telehealth service.   An interactive audio and video telecommunication system which permits real time communications between the patient (at the originating site) and provider (at the distant site) was utilized to provide this telehealth service.   ,     An interactive audio and video telecommunication system which permits real time communications between the patient (at the originating site) and provider (at the distant site) was utilized to provide this telehealth service.   Verbal consent was requested and obtained from Nargis Gill on this date, 12/05/24 for a telehealth visit.      Review of Systems   All other systems reviewed and are negative.    Pt needs lipids checked  Pt is also concerned about sleep apnea  She does wake up  Snores  Pt has fatigue  Objective   LMP  (LMP Unknown)     Physical Exam  Constitutional:       Appearance: Normal appearance.   HENT:      Right Ear: External ear normal.      Left Ear: External ear normal.      Nose: Nose normal.   Eyes:      Conjunctiva/sclera: Conjunctivae normal.   Pulmonary:      Effort: Pulmonary effort is normal.   Neurological:      General: No focal deficit present.      Mental Status: She is alert and oriented to person, place, and time.   Psychiatric:         Mood and Affect: Mood normal.         Behavior: Behavior normal.         Assessment/Plan   Diagnoses and all orders for this visit:  Pure hypercholesterolemia  -     Comprehensive Metabolic Panel; Future  -     Lipid Panel; Future  -     Lipoprotein NMR; Future  Screening mammogram for breast cancer  -     BI  mammo bilateral screening tomosynthesis; Future  Obstructive sleep apnea syndrome  -     Home sleep apnea test (HSAT); Future  Rto 2-25 cpe

## 2024-12-07 ENCOUNTER — LAB (OUTPATIENT)
Dept: LAB | Facility: LAB | Age: 64
End: 2024-12-07
Payer: COMMERCIAL

## 2024-12-07 DIAGNOSIS — E78.00 PURE HYPERCHOLESTEROLEMIA: ICD-10-CM

## 2024-12-07 LAB
ALBUMIN SERPL BCP-MCNC: 4.4 G/DL (ref 3.4–5)
ALP SERPL-CCNC: 63 U/L (ref 33–136)
ALT SERPL W P-5'-P-CCNC: 22 U/L (ref 7–45)
ANION GAP SERPL CALC-SCNC: 11 MMOL/L (ref 10–20)
AST SERPL W P-5'-P-CCNC: 17 U/L (ref 9–39)
BILIRUB SERPL-MCNC: 0.5 MG/DL (ref 0–1.2)
BUN SERPL-MCNC: 15 MG/DL (ref 6–23)
CALCIUM SERPL-MCNC: 10.1 MG/DL (ref 8.6–10.6)
CHLORIDE SERPL-SCNC: 105 MMOL/L (ref 98–107)
CHOLEST SERPL-MCNC: 160 MG/DL (ref 0–199)
CHOLESTEROL/HDL RATIO: 2.5
CO2 SERPL-SCNC: 27 MMOL/L (ref 21–32)
CREAT SERPL-MCNC: 0.68 MG/DL (ref 0.5–1.05)
EGFRCR SERPLBLD CKD-EPI 2021: >90 ML/MIN/1.73M*2
GLUCOSE SERPL-MCNC: 97 MG/DL (ref 74–99)
HDLC SERPL-MCNC: 64.7 MG/DL
LDLC SERPL CALC-MCNC: 76 MG/DL
NON HDL CHOLESTEROL: 95 MG/DL (ref 0–149)
POTASSIUM SERPL-SCNC: 4.4 MMOL/L (ref 3.5–5.3)
PROT SERPL-MCNC: 6.6 G/DL (ref 6.4–8.2)
SODIUM SERPL-SCNC: 139 MMOL/L (ref 136–145)
TRIGL SERPL-MCNC: 97 MG/DL (ref 0–149)
VLDL: 19 MG/DL (ref 0–40)

## 2024-12-07 PROCEDURE — 83704 LIPOPROTEIN BLD QUAN PART: CPT

## 2024-12-07 PROCEDURE — 80053 COMPREHEN METABOLIC PANEL: CPT

## 2024-12-07 PROCEDURE — 80061 LIPID PANEL: CPT

## 2024-12-07 PROCEDURE — 36415 COLL VENOUS BLD VENIPUNCTURE: CPT

## 2024-12-11 ENCOUNTER — OFFICE VISIT (OUTPATIENT)
Dept: ORTHOPEDIC SURGERY | Facility: HOSPITAL | Age: 64
End: 2024-12-11
Payer: COMMERCIAL

## 2024-12-11 DIAGNOSIS — M17.11 PRIMARY OSTEOARTHRITIS OF RIGHT KNEE: Primary | ICD-10-CM

## 2024-12-11 LAB
CHOLEST SERPL-MCNC: 155 MG/DL (ref 100–199)
HDL SERPL-SCNC: 38.6 UMOL/L
HDLC SERPL-MCNC: 68 MG/DL
LDL SERPL QN: 21.3 NM
LDL SERPL-SCNC: 790 NMOL/L
LDL SMALL SERPL-SCNC: 189 NMOL/L
LDLC SERPL CALC-MCNC: 70 MG/DL (ref 0–99)
LP-IR SCORE SERPL: <25
TRIGL SERPL-MCNC: 94 MG/DL (ref 0–149)

## 2024-12-11 PROCEDURE — 99213 OFFICE O/P EST LOW 20 MIN: CPT | Performed by: STUDENT IN AN ORGANIZED HEALTH CARE EDUCATION/TRAINING PROGRAM

## 2024-12-11 ASSESSMENT — PAIN - FUNCTIONAL ASSESSMENT: PAIN_FUNCTIONAL_ASSESSMENT: 0-10

## 2024-12-11 ASSESSMENT — PAIN SCALES - GENERAL: PAINLEVEL_OUTOF10: 5 - MODERATE PAIN

## 2024-12-11 ASSESSMENT — PAIN DESCRIPTION - DESCRIPTORS: DESCRIPTORS: ACHING;SHARP;THROBBING

## 2024-12-11 NOTE — PROGRESS NOTES
Charisse Werner MD   Adult Reconstruction and Joint Replacement Surgery  Phone: [phone]     Fax: [phone]       Name: Nargis Gill  Age: 64 y.o.   : 1960   Date of Visit: 2024    Follow-up Knee    CC: Follow-up RIGHT knee     History of Present Illness:  This patient presents with several months of RIGHT knee pain.     They were last seen for this problem on 24. At the last visit, the patient was doing well.  She was fit for non custom medial  brace.  The patient reports the brace has been serving her well.  She is taking Advil occasionally.  Her knee pain and swelling have improved.  She is hoping to get more active.  She is try to lose some weight.  She is not sure if she is ready for another injection.    Patient has tried the following Ice, NSAIDs, Activity modification, corticosteroid injection, brace , and Xray. Date of last steroid injection: 2024. Patient does have pain at night. Patient is able to walk several blocks but has pain. Patient is currently using cane as assistive device. Primarily complains of medial pain. Patient has difficulty with walking  and walking on unlevel surfaces, pivoting. The pain is significantly impacting their ability to perform activities of daily living. Patient reports no longer able to do activities such as playing with grandkids. Works as optometrist private.     HISTORY  PROMs   No questionnaires on file.     Past Medical History:   Diagnosis Date    Agatston coronary artery calcium score greater than 400 2023    721    Carcinoma in situ of cervix, unspecified     Severe dysplasia of cervix (ARTIS III)    Coronary artery disease involving native coronary artery of native heart without angina pectoris 2023    Familial hypercholesteremia 2023    Moderate cervical dysplasia 2014    ARTIS II (cervical intraepithelial neoplasia II)    Other specified noninflammatory disorders of vagina     Vaginal odor     Personal history of other diseases of the circulatory system     History of varicose veins    Personal history of other diseases of the female genital tract     Vaginal delivery    Personal history of other diseases of the female genital tract     History of vaginal discharge    Personal history of other infectious and parasitic diseases     History of HPV infection       Past Medical History:   Diagnosis Date    Agatston coronary artery calcium score greater than 400 12/19/2023    721    Carcinoma in situ of cervix, unspecified     Severe dysplasia of cervix (ARTIS III)    Coronary artery disease involving native coronary artery of native heart without angina pectoris 12/19/2023    Familial hypercholesteremia 12/19/2023    Moderate cervical dysplasia 12/16/2014    ARTIS II (cervical intraepithelial neoplasia II)    Other specified noninflammatory disorders of vagina     Vaginal odor    Personal history of other diseases of the circulatory system     History of varicose veins    Personal history of other diseases of the female genital tract     Vaginal delivery    Personal history of other diseases of the female genital tract     History of vaginal discharge    Personal history of other infectious and parasitic diseases     History of HPV infection     Documented in chart and reviewed.     Past Surgical History:   Procedure Laterality Date    CATARACT EXTRACTION  03/25/2014    Cataract Surgery    COLONOSCOPY      OTHER SURGICAL HISTORY  10/14/2021    Cystoscopy    OTHER SURGICAL HISTORY  10/14/2021    Tibia fracture repair    OTHER SURGICAL HISTORY  05/08/2014    Colposcopy Cervix With Biopsy(S)       Allergies: She has No Known Allergies.     Medications:  Current Outpatient Medications   Medication Instructions    aspirin 81 mg, oral, Daily    co-enzyme Q-10 30 mg, Daily    multivitamin tablet Take by mouth.    Repatha SureClick 140 mg, subcutaneous, Every 14 days    risankizumab-rzaa (Skyrizi) 150 mg/mL pen injector  pen Inject 1 pen (150 mg) under the skin every 12 weeks. Start 12 weeks after completion of your first two injections.    rosuvastatin (CRESTOR) 20 mg, oral, Daily    triamcinolone (Kenalog) 0.1 % ointment PLEASE SEE ATTACHED FOR DETAILED DIRECTIONS    Twinrix, PF, 720 DIAMOND unit- 20 mcg/mL syringe        Family History   Problem Relation Name Age of Onset    Kidney cancer Mother Gena Winkler     Cancer Mother Gena Winkler     Other (ANXIETY) Father DIVINE WINKLER     Heart disease Father DIVINE WINKLER      Documented in chart and reviewed.     Social History     Tobacco Use    Smoking status: Never    Smokeless tobacco: Never    Tobacco comments:     second hand smoke/college jobs   Substance Use Topics    Alcohol use: Not Currently     Comment: Rare alcohol use        Review of Systems: Review of systems completed with medical assistant intake. Please refer to this note.     Physical Exam:  BMI: 28.    General: The patient is well appearing and has an appropriate affect.     Neurological Examination: SILT in SPN/DPN/Sural/Saphenous/Tibial nerves. 5/5 EHL, FHL, Tibial anterior, Gastrocnemius. Coordination grossly intact.     Cardiovascular Exam: Capillary refill <2 seconds.     Lymphatic Examination: There is no obvious lymphatic swelling present around the involved joint.    Skin Exam: Skin around the pertinent joint is without evidence of infection or rash.    Gait: patient ambulates with antalgic gait.    Right Hip Examination:  Examination of the hip reveals the skin to be intact.     There is no tenderness over the greater trochanter.    Range of motion is full extension to 100 degrees of flexion.    The hip is stable without subluxation or dislocation.    The hip internally rotates to 15 degrees and externally rotates to 45 degrees.    There is no pain with hip motion.    Left Hip Examination:  Examination of the hip reveals the skin to be intact.     There is no tenderness over the greater trochanter.    Range of  motion is full extension to 100 degrees of flexion.    The hip is stable without subluxation or dislocation.    The hip internally rotates to 15 degrees and externally rotates to 45 degrees.    There is no pain with hip motion.    Left Knee Examination:  Examination of the left knee reveals the skin to be intact.    There is no effusion in the knee.    The alignment of the knee is neutral.    This deformity is correctable.    There is tenderness to palpation over the joint line.    There is significant quadriceps atrophy.    Range of Motion: full extension to 135 degrees of flexion.    The knee is stable to varus-valgus stress and anterior-posterior stress.     There is no grinding with range of motion.    There is no patellofemoral crepitus.    Right Knee Examination:  Examination of the left knee reveals the skin to be intact.    There is a small effusion in the knee.    The alignment of the knee is Varus.    This deformity is correctable.    There is tenderness to palpation over the joint line.    There is significant quadriceps atrophy.    Range of Motion: 5 to 130 degrees of flexion.    The knee is stable to varus-valgus stress and anterior-posterior stress.     There is mild grinding with range of motion.    There is no patellofemoral crepitus.    Imaging:  Radiographs were personally reviewed today. There is evidence of mild  RIGHT  knee osteoarthritis without bone on bone apposition.    Impression and Plan:  This patient is here for follow-up evaluation of RIGHT knee.    DIAGNOSIS  Primary osteoarthritis of right knee     I have discussed the options in detail with the patient. We have discussed anti-inflammatory medication, activity modification, physical therapy, corticosteroid injections, viscosupplementation injections, partial knee replacement surgery and total knee replacement surgery.  The patient is responding well to nonsurgical treatment measures.    The risks and benefits of all these treatment  options have been discussed in detail.     The patient has tried at least 3 months of the above conservative treatments and continues to have disabling pain, impaired activities of daily living and worsened quality of life.  Reviewed the surgical optimization steps to optimize their chances for a successful joint replacement surgery.      She will continue physical therapy.  A new referral was provided today.  A physical therapy prescription was ordered for the patient.  Patient will continue their home exercise program. Strategies for pain management using over-the-counter anti-inflammatory medications reviewed -Advil discussed.  The patient defers steroid injection today as pain is not that significant.  This can always be considered in the future.  Encouraged them to maintain range of motion and strength around the knee joints.  They will continue to implement these strategies in addressing their pain.      Recommend the patient continue optimizing nonsurgical treatment interventions as outlined above for management of their knee arthritis.  I would be happy to see them again at any point to discuss surgery if they are more optimized or to review progress with nonsurgical treatment of arthritis. The patient verbalizes understanding with the recommendations and treatment plan as outlined above and is in agreement.  Questions were addressed.    RTC: as needed    X-rays at next visit: as needed        _____________________  Charisse Werner MD   Attending Orthopaedic Surgeon  [facility]    [facility]    This office note was transcribed with dictation software.  Please excuse any typographical errors, program misunderstandings leading to inadvertent insertions or deletions of inappropriate wording, pronoun errors and other unintentional transcription errors not noticed on proof-reading.

## 2024-12-17 ENCOUNTER — APPOINTMENT (OUTPATIENT)
Dept: RHEUMATOLOGY | Facility: CLINIC | Age: 64
End: 2024-12-17
Payer: COMMERCIAL

## 2024-12-17 VITALS — DIASTOLIC BLOOD PRESSURE: 80 MMHG | SYSTOLIC BLOOD PRESSURE: 130 MMHG

## 2024-12-17 DIAGNOSIS — L40.9 PSORIASIS: Primary | ICD-10-CM

## 2024-12-17 DIAGNOSIS — M17.0 PRIMARY OSTEOARTHRITIS OF BOTH KNEES: ICD-10-CM

## 2024-12-17 PROCEDURE — 1036F TOBACCO NON-USER: CPT | Performed by: INTERNAL MEDICINE

## 2024-12-17 PROCEDURE — 99204 OFFICE O/P NEW MOD 45 MIN: CPT | Performed by: INTERNAL MEDICINE

## 2024-12-17 ASSESSMENT — RHEUMATOLOGY NEW PATIENT QUESTIONNAIRE
INCREASED SUSCEPTIBILITY TO INFECTION: N
SKIN TIGHTNESS: N
VAGINAL DRYNESS: N
UNUSUAL FATIGUE: Y
UNUSUALLY RAPID OR SLOWED HEART RATE: N
NUMBNESS OR TINGLING IN HANDS OR FEET: N
UNUSUAL BLEEDING: N
HEARTBURN OR REFLUX: N
HEADACHES: N
JOINT SWELLING: Y
EYE REDNESS: N
SWOLLEN LEGS OR FEET: N
RASH: N
LOSS OF VISION: N
DRYNESS OF MOUTH: N
UNEXPLAINED HEARING LOSS: N
HOW WOULD YOU DESCRIBE YOUR STIFFNESS ON AVERAGE: MODERATE
DOUBLE OR BLURRED VISION: N
NAUSEA: N
DEPRESSION: N
MEMORY LOSS: N
HOARSE VOICE: N
STOMACH PAIN: N
SORES IN MOUTH OR NOSE: N
EASILY LOSING TEMPER: N
RASH OR ULCERS: N
COLOR CHANGES OF HANDS OR FEET IN THE COLD: N
DIFFICULTY STAYING ASLEEP: N
VOMITING OF BLOOD OR COFFEE GROUND CONSISTENCY MATERIAL: N
SEIZURES: N
MORNING STIFFNESS: Y
FEVER: N
ANEMIA: N
BLOOD IN STOOLS: N
PAIN OR BURNING ON URINATION: N
MUSCLE WEAKNESS: Y
AGITATION: N
EYE PAIN: N
EXCESSIVE HAIR LOSS (MORE THAN YOUR NORM): N
BEHAVIORAL CHANGES: N
COUGH: N
MORNING STIFFNESS IN LOWER BACK: Y
NODULES/BUMPS: N
DIFFICULTY SWALLOWING: N
SUN SENSITIVE (SUN ALLERGY): N
NIGHT SWEATS: N
SHORTNESS OF BREATH: N
UNEXPLAINED WEIGHT CHANGE: N
BLACK STOOLS: N
LOSS OF CONSCIOUSNESS: N
JOINT PAIN: Y
ABNORMAL URINE: N
DIFFICULTY BREATHING LYING DOWN: N
ANXIETY: N
EYE DRYNESS: Y
SWOLLEN OR TENDER GLANDS: N
FAINTING: N
SKIN REDNESS: N
JAUNDICE: N
DIFFICULTY FALLING ASLEEP: N
PERSISTENT DIARRHEA: N
EASY BRUISING: N
CHEST PAIN: N

## 2024-12-17 NOTE — PROGRESS NOTES
Subjective . Nargis Gill is a 64 y.o. female who presents for Psoriasis (New pt with psoriasis and joint changes. ).    HPI.  64-year-old female with history of psoriasis, knee OA and familial hypercholesterolemia referred for psoriatic arthritis evaluation.    She reports knee pain for the past 3 years.  Right knee hurts more than left.  Pain is pretty much persistent.  Right knee pain gets worse upon getting up and walking.  Sometimes she feels swelling of the right knee.  She rates right pain up to 5/10 at times.  She states knee pain is improving since she began the physical therapy recently.     She also notes intermittent soreness in the hands for the past 1 year.  She states her hands are stiff in the morning for 30 minutes.  She has not noticed numbness, tingling or swelling of the knuckles.  She fractured right wrist after fall in September 2023.  Treated conservatively.      She has psoriasis as a child.  She states psoriasis has cleared after 2 injections of Skyrizi. (8/2024).      Social history: She is .  She is an optometrist.  She does not smoke.  She rarely drinks alcoholic beverages.  Family history: Mother has had renal cancer.  Father probably has psoriasis.  No family history of psoriatic arthritis.    Review of Systems   All other systems reviewed and are negative.    Objective     Blood pressure 130/80.    Physical Exam.  Gen. AAO x3, NAD.  HEENT: No pallor or icterus, PERRLA, EOMI. Oropharynx is clear. MM moist,Parotid glands  not enlarged. No cervical lymphadenopathy .  Skin: No rashes.  Heart: S1, S2/ RRR. No murmurs or gallops.  Lungs: CTA B.  Abdomen: Soft, NT/ND, BS regular.  MSK: No.swelling or tenderness of the hands, wrist, elbows, shoulders, ankles and MTP joints.  Bilateral knee with mild patellofemoral crepitation.  No knee joint laxity.  No knee effusion.    Neuro: CN II-XII intact. Sensation to touch intact.Strength 5/5 throughout. DTR 2+ and  symmetrical.  Psych:Appropriate mood and behavior  EXT: No edema    Assessment/Plan . 64-year-old female with history of psoriasis, knee OA and familial hypercholesterolemia presented with joint pain as aforementioned.  After history, physical examination and review of available labs and imaging studies, it appears she has knee osteoarthritis.  Do not think she has psoriatic arthritis.  Patient was reassured.  She is to continue Skyrizi per dermatology.  Discussed to take Tylenol or over-the-counter NSAIDs for knee arthritis.    Follow-up with rheumatology as needed.     This note was partially generated using the Dragon Voice recognition system. There may be some incorrect wording, spelling and/or spelling errors or punctuation errors that were not corrected prior to committing the note to the medical record.    Problem List Items Addressed This Visit    None           Active Ambulatory Problems     Diagnosis Date Noted    Pure hypercholesterolemia 12/07/2023    Wellness examination 12/07/2023    Coronary artery disease involving native coronary artery of native heart without angina pectoris 12/19/2023    Agatston coronary artery calcium score greater than 400 12/19/2023    Familial hypercholesteremia 12/19/2023    HGSIL (high grade squamous intraepithelial lesion) on Pap smear of cervix 01/12/2024    Atypical glandular cells of undetermined significance (MARIANNE) on cervical Pap smear 01/12/2024    Cervical dysplasia 02/13/2024    Amenorrhea 12/05/2024    Contact dermatitis due to poison ivy 12/05/2024    Fatigue 12/05/2024    History of infection due to human papilloma virus (HPV) 12/05/2024    History of varicose veins 12/05/2024    Hyperlipemia 12/05/2024    Hyperlipidemia 12/05/2024    Adenopathy 12/05/2024    Migraine headache 12/05/2024    Psoriasis 12/05/2024    Eshroeuelakor-bgtjjurl-elscct-triquetral-perilunate fracture dislocation 09/14/2023    Vaginal delivery ([facility]) 12/05/2024    Vaginal odor  12/05/2024    Vitamin D deficiency 12/05/2024     Resolved Ambulatory Problems     Diagnosis Date Noted    No Resolved Ambulatory Problems     Past Medical History:   Diagnosis Date    Carcinoma in situ of cervix, unspecified     Moderate cervical dysplasia 12/16/2014    Other specified noninflammatory disorders of vagina     Personal history of other diseases of the circulatory system     Personal history of other diseases of the female genital tract     Personal history of other diseases of the female genital tract     Personal history of other infectious and parasitic diseases        Family History   Problem Relation Name Age of Onset    Kidney cancer Mother Gena Winkler     Cancer Mother Gena Winkler     Other (ANXIETY) Father DIVINE WINKLER     Heart disease Father DIVINE WINKLER        Past Surgical History:   Procedure Laterality Date    CATARACT EXTRACTION  03/25/2014    Cataract Surgery    COLONOSCOPY      OTHER SURGICAL HISTORY  10/14/2021    Cystoscopy    OTHER SURGICAL HISTORY  10/14/2021    Tibia fracture repair    OTHER SURGICAL HISTORY  05/08/2014    Colposcopy Cervix With Biopsy(S)       Social History     Tobacco Use   Smoking Status Never   Smokeless Tobacco Never   Tobacco Comments    second hand smoke/college jobs       Allergies  Patient has no known allergies.    Current Meds  Current Outpatient Medications   Medication Instructions    aspirin 81 mg, oral, Daily    co-enzyme Q-10 30 mg, Daily    multivitamin tablet Take by mouth.    Repatha SureClick 140 mg, subcutaneous, Every 14 days    risankizumab-rzaa (Skyrizi) 150 mg/mL pen injector pen Inject 1 pen (150 mg) under the skin every 12 weeks. Start 12 weeks after completion of your first two injections.    rosuvastatin (CRESTOR) 20 mg, oral, Daily    triamcinolone (Kenalog) 0.1 % ointment PLEASE SEE ATTACHED FOR DETAILED DIRECTIONS    Twinrix, PF, 720 DIAMOND unit- 20 mcg/mL syringe           Tom Sanders MD

## 2024-12-17 NOTE — PATIENT INSTRUCTIONS
Take Tylenol or over-the-counter anti-inflammatory medications as discussed.  Call me if any question.  Follow-up as needed.   Partially impaired: cannot see medication labels or newsprint, but can see obstacles in path, and the surrounding layout; can count fingers at arm's length

## 2024-12-19 ENCOUNTER — HOSPITAL ENCOUNTER (OUTPATIENT)
Dept: RADIOLOGY | Facility: CLINIC | Age: 64
Discharge: HOME | End: 2024-12-19
Payer: COMMERCIAL

## 2024-12-19 VITALS — BODY MASS INDEX: 27.48 KG/M2 | WEIGHT: 171 LBS | HEIGHT: 66 IN

## 2024-12-19 DIAGNOSIS — Z12.31 SCREENING MAMMOGRAM FOR BREAST CANCER: ICD-10-CM

## 2024-12-19 PROCEDURE — 77063 BREAST TOMOSYNTHESIS BI: CPT

## 2024-12-19 PROCEDURE — 77063 BREAST TOMOSYNTHESIS BI: CPT | Performed by: RADIOLOGY

## 2024-12-19 PROCEDURE — 77067 SCR MAMMO BI INCL CAD: CPT | Performed by: RADIOLOGY

## 2024-12-23 ENCOUNTER — TELEPHONE (OUTPATIENT)
Dept: PRIMARY CARE | Facility: CLINIC | Age: 64
End: 2024-12-23
Payer: COMMERCIAL

## 2024-12-23 NOTE — TELEPHONE ENCOUNTER
PT SAID YOU PUT AN ORDER IN FOR A HOME SLEEP STUDY TEST BUT SHE HAS NOT RECEIVED IT. IS IS SUPPOSED TO TAKE THIS LONG TO RECEIVE IT? SHE IS JUST WONDERING OR DOES SHE HAVE TO CONTACT SOMEONE? PLEASE ADVISE.

## 2024-12-27 ENCOUNTER — APPOINTMENT (OUTPATIENT)
Dept: RHEUMATOLOGY | Facility: CLINIC | Age: 64
End: 2024-12-27
Payer: COMMERCIAL

## 2025-01-07 ASSESSMENT — DERMATOLOGY QUALITY OF LIFE (QOL) ASSESSMENT
RATE HOW BOTHERED YOU ARE BY SYMPTOMS OF YOUR SKIN PROBLEM (EG, ITCHING, STINGING BURNING, HURTING OR SKIN IRRITATION): 0 - NEVER BOTHERED
DATE THE QUALITY-OF-LIFE ASSESSMENT WAS COMPLETED: 67212
RATE HOW BOTHERED YOU ARE BY SYMPTOMS OF YOUR SKIN PROBLEM (EG, ITCHING, STINGING BURNING, HURTING OR SKIN IRRITATION): 0 - NEVER BOTHERED
WHAT SINGLE SKIN CONDITION LISTED BELOW IS THE PATIENT ANSWERING THE QUALITY-OF-LIFE ASSESSMENT QUESTIONS ABOUT: PSORIASIS
RATE HOW BOTHERED YOU ARE BY EFFECTS OF YOUR SKIN PROBLEMS ON YOUR ACTIVITIES (EG, GOING OUT, ACCOMPLISHING WHAT YOU WANT, WORK ACTIVITIES OR YOUR RELATIONSHIPS WITH OTHERS): 0 - NEVER BOTHERED
RATE HOW BOTHERED YOU ARE BY EFFECTS OF YOUR SKIN PROBLEMS ON YOUR ACTIVITIES (EG, GOING OUT, ACCOMPLISHING WHAT YOU WANT, WORK ACTIVITIES OR YOUR RELATIONSHIPS WITH OTHERS): 0 - NEVER BOTHERED
RATE HOW EMOTIONALLY BOTHERED YOU ARE BY YOUR SKIN PROBLEM (FOR EXAMPLE, WORRY, EMBARRASSMENT, FRUSTRATION): 0 - NEVER BOTHERED
RATE HOW EMOTIONALLY BOTHERED YOU ARE BY YOUR SKIN PROBLEM (FOR EXAMPLE, WORRY, EMBARRASSMENT, FRUSTRATION): 0 - NEVER BOTHERED
WHAT SINGLE SKIN CONDITION LISTED BELOW IS THE PATIENT ANSWERING THE QUALITY-OF-LIFE ASSESSMENT QUESTIONS ABOUT: PSORIASIS

## 2025-01-07 ASSESSMENT — PATIENT GLOBAL ASSESSMENT (PGA): WHAT IS THE PGA: PATIENT GLOBAL ASSESSMENT:  1 - CLEAR

## 2025-01-08 ENCOUNTER — APPOINTMENT (OUTPATIENT)
Dept: DERMATOLOGY | Facility: CLINIC | Age: 65
End: 2025-01-08
Payer: COMMERCIAL

## 2025-01-08 DIAGNOSIS — Z79.899 LONG-TERM USE OF HIGH-RISK MEDICATION: Primary | ICD-10-CM

## 2025-01-08 DIAGNOSIS — L40.9 PSORIASIS: ICD-10-CM

## 2025-01-08 PROCEDURE — 99213 OFFICE O/P EST LOW 20 MIN: CPT | Performed by: DERMATOLOGY

## 2025-01-08 PROCEDURE — 1036F TOBACCO NON-USER: CPT | Performed by: DERMATOLOGY

## 2025-01-08 PROCEDURE — G2211 COMPLEX E/M VISIT ADD ON: HCPCS | Performed by: DERMATOLOGY

## 2025-01-08 ASSESSMENT — PATIENT GLOBAL ASSESSMENT (PGA): PATIENT GLOBAL ASSESSMENT: PATIENT GLOBAL ASSESSMENT:  1 - CLEAR

## 2025-01-08 ASSESSMENT — DERMATOLOGY QUALITY OF LIFE (QOL) ASSESSMENT
RATE HOW BOTHERED YOU ARE BY SYMPTOMS OF YOUR SKIN PROBLEM (EG, ITCHING, STINGING BURNING, HURTING OR SKIN IRRITATION): 0 - NEVER BOTHERED
WHAT SINGLE SKIN CONDITION LISTED BELOW IS THE PATIENT ANSWERING THE QUALITY-OF-LIFE ASSESSMENT QUESTIONS ABOUT: PSORIASIS
RATE HOW BOTHERED YOU ARE BY EFFECTS OF YOUR SKIN PROBLEMS ON YOUR ACTIVITIES (EG, GOING OUT, ACCOMPLISHING WHAT YOU WANT, WORK ACTIVITIES OR YOUR RELATIONSHIPS WITH OTHERS): 0 - NEVER BOTHERED
ARE THERE EXCLUSIONS OR EXCEPTIONS FOR THE QUALITY OF LIFE ASSESSMENT: NO
RATE HOW EMOTIONALLY BOTHERED YOU ARE BY YOUR SKIN PROBLEM (FOR EXAMPLE, WORRY, EMBARRASSMENT, FRUSTRATION): 0 - NEVER BOTHERED
DATE THE QUALITY-OF-LIFE ASSESSMENT WAS COMPLETED: 67213

## 2025-01-08 ASSESSMENT — DERMATOLOGY PATIENT ASSESSMENT
DO YOU USE A TANNING BED: NO
ARE YOU AN ORGAN TRANSPLANT RECIPIENT: NO
DO YOU USE SUNSCREEN: OCCASIONALLY
HAVE YOU HAD OR DO YOU HAVE VASCULAR DISEASE: NO
HAVE YOU HAD OR DO YOU HAVE A STAPH INFECTION: NO
ARE YOU ON BIRTH CONTROL: NO
ARE YOU TRYING TO GET PREGNANT: NO
DO YOU HAVE ANY NEW OR CHANGING LESIONS: NO
DO YOU HAVE IRREGULAR MENSTRUAL CYCLES: NO

## 2025-01-08 ASSESSMENT — ITCH NUMERIC RATING SCALE: HOW SEVERE IS YOUR ITCHING?: 0

## 2025-01-08 NOTE — PROGRESS NOTES
Subjective     Nargis Gill is a 65 y.o. female who presents for the following: Psoriasis (Pt reports doing well with Skyrizi.  She states her skin is currently clear. Has only needed to use triamcinolone ointment sparingly.).     65 year old female who presents for follow-up for psoriasis. She has previously tried roflumilast cream 0.3% and triamcinolone 0.1% cream with minimal improvement. At her last visit on 8/2024, she was started on Skyrizi. Pt notes great improvement after starting the medication. She notes that her skin is clear. She has to apply triamcinolone 0.1% cream about 2x/week to persistent areas such as the elbows.    She saw rheumatology on 12/2024 who evaluated her for psoriatic arthritis. They determined that she most likely has osteoarthritis of her knees. She saw orthopedic surgery and they recommended physical therapy and continued weight loss.     Denies injection site reactions. Notes no increased frequency of sicknesses or having any sickness lasting longer than normal.    Skin Cancer History  No skin cancer on file.    Review of Systems:  No other skin or systemic complaints other than what is documented elsewhere in the note.    The following portions of the chart were reviewed this encounter and updated as appropriate:       Specialty Problems          Dermatology Problems    Psoriasis     Comment on above: Added by Problem List Migration; 2013-9-15; Moved to [facility] Nov 25 2013 9:20PM;          Past Medical History:  Nargis Gill  has a past medical history of Agatston coronary artery calcium score greater than 400 (12/19/2023), Carcinoma in situ of cervix, unspecified, Coronary artery disease involving native coronary artery of native heart without angina pectoris (12/19/2023), Familial hypercholesteremia (12/19/2023), Moderate cervical dysplasia (12/16/2014), Other specified noninflammatory disorders of vagina, Personal history of other diseases of the circulatory system,  Personal history of other diseases of the female genital tract, Personal history of other diseases of the female genital tract, and Personal history of other infectious and parasitic diseases.    Past Surgical History:  Nargis Gill  has a past surgical history that includes Cataract extraction (03/25/2014); Other surgical history (10/14/2021); Other surgical history (10/14/2021); Other surgical history (05/08/2014); and Colonoscopy.    Family History:  Patient family history includes ANXIETY in her father; Cancer in her mother; Heart disease in her father; Kidney cancer in her mother.    Social History:  Nargis Gill  reports that she has never smoked. She has never used smokeless tobacco. She reports that she does not currently use alcohol. She reports that she does not use drugs.    Allergies:  Patient has no known allergies.    Current Medications / CAM's:    Current Outpatient Medications:     co-enzyme Q-10 30 mg capsule, Take 1 capsule (30 mg) by mouth once daily., Disp: , Rfl:     evolocumab (Repatha SureClick) 140 mg/mL injection, Inject 1 mL (140 mg) under the skin every 14 (fourteen) days., Disp: 6 each, Rfl: 3    multivitamin tablet, Take by mouth., Disp: , Rfl:     triamcinolone (Kenalog) 0.1 % ointment, PLEASE SEE ATTACHED FOR DETAILED DIRECTIONS, Disp: , Rfl:     Twinrix, PF, 720 DIAMOND unit- 20 mcg/mL syringe, , Disp: , Rfl:     risankizumab-rzaa (Skyrizi) 150 mg/mL pen injector pen, Inject 1 pen (150 mg) under the skin every 12 weeks. Start 12 weeks after completion of your first two injections., Disp: 1 mL, Rfl: 3    rosuvastatin (Crestor) 20 mg tablet, Take 1 tablet (20 mg) by mouth once daily., Disp: 30 tablet, Rfl: 11     Objective   Well appearing patient in no apparent distress; mood and affect are within normal limits.    A focused examination  was performed including scalp, head, eyes, ears, nose, lips, neck, chest, axillae, abdomen, back, buttocks, bilateral upper extremities, hands,  fingers, All findings within normal limits unless otherwise noted below.    Pink scaly plaques on the bilateral elbows. BSA 1%         Assessment/Plan   Long-term use of high-risk medication    Psoriasis    Psoriasis Vulgaris, improved  - At her previous visit, she was started on Skyrizi and has greatly improved with treatment   - Previous therapies: clobetasol (stopped d/t concern for steroid atrophy), tacrolimus  - Evaluated by rheumatology 12/2024. They believe her joint pain is osteoarthritis. She is seeing orthopedic surgery for further evaluation and management.  - Continue triamcinolone 0.1% ointment as needed for breakthrough flares. Pt counseled on risks and benefits of topical corticosteroid use including skin atrophy, telangectasias, and hypo/ hyperpigmentation  - Baseline labwork reviewed, HIV hepatitis panel negative. T spot 8/2024 negative.  - T spot 8/2025  - Continue Risankizumab (Skyrizi, IL-23 inh) q12 weeks. Reviewed risks and benefits including but not limited to injection site reactions, recurrent infections including upper respiratory and fungal, headache, fatigue, allergic reactions including severe ones. Patient denies any history of chronic infections. Pt notes that she has to pay $20,000 per injection. Will send prior auth again to  specialty pharmacy for payment assistance.    T-SPOT (Tb)    Related Procedures  Follow Up In Dermatology - Established Patient  Follow Up In Dermatology - Established Patient    Related Medications  risankizumab-rzaa (Skyrizi) 150 mg/mL pen injector pen  Inject 1 pen (150 mg) under the skin every 12 weeks. Start 12 weeks after completion of your first two injections.       RTC 7 months for this chronic condition that we are actively managing  I am the point person for this patients longitudinal, complex care of psoriasis requiring management of systemic medications.     Patient seen and discussed with Dr. Ridley,   Lulu Abdalla MD MPH  PGY-2, Department of  Dermatology    I saw and evaluated the patient. I personally obtained the key and critical portions of the history and physical exam or was physically present for key and critical portions performed by the resident/fellow. I reviewed the resident/fellow's documentation and discussed the patient with the resident/fellow. I agree with the resident/fellow's medical decision making as documented in the note.    Trina Ridley MD

## 2025-01-12 DIAGNOSIS — E78.01 FAMILIAL HYPERCHOLESTEREMIA: ICD-10-CM

## 2025-01-12 DIAGNOSIS — I25.10 CORONARY ARTERY DISEASE INVOLVING NATIVE CORONARY ARTERY OF NATIVE HEART WITHOUT ANGINA PECTORIS: ICD-10-CM

## 2025-01-12 DIAGNOSIS — R93.1 AGATSTON CORONARY ARTERY CALCIUM SCORE GREATER THAN 400: ICD-10-CM

## 2025-01-13 RX ORDER — ROSUVASTATIN CALCIUM 20 MG/1
20 TABLET, COATED ORAL DAILY
Qty: 30 TABLET | Refills: 11 | OUTPATIENT
Start: 2025-01-13

## 2025-01-28 ENCOUNTER — SPECIALTY PHARMACY (OUTPATIENT)
Dept: PHARMACY | Facility: CLINIC | Age: 65
End: 2025-01-28

## 2025-01-30 ENCOUNTER — TELEPHONE (OUTPATIENT)
Dept: DERMATOLOGY | Facility: CLINIC | Age: 65
End: 2025-01-30
Payer: COMMERCIAL

## 2025-01-30 ENCOUNTER — TELEPHONE (OUTPATIENT)
Dept: CARDIOLOGY | Facility: CLINIC | Age: 65
End: 2025-01-30
Payer: COMMERCIAL

## 2025-01-30 ENCOUNTER — SPECIALTY PHARMACY (OUTPATIENT)
Dept: PHARMACY | Facility: CLINIC | Age: 65
End: 2025-01-30

## 2025-01-30 DIAGNOSIS — E78.01 FAMILIAL HYPERCHOLESTEREMIA: ICD-10-CM

## 2025-01-30 PROCEDURE — RXMED WILLOW AMBULATORY MEDICATION CHARGE

## 2025-01-30 RX ORDER — EVOLOCUMAB 140 MG/ML
INJECTION, SOLUTION SUBCUTANEOUS
Refills: 35 | OUTPATIENT
Start: 2025-01-30

## 2025-01-31 ENCOUNTER — PHARMACY VISIT (OUTPATIENT)
Dept: PHARMACY | Facility: CLINIC | Age: 65
End: 2025-01-31
Payer: COMMERCIAL

## 2025-02-07 ENCOUNTER — SPECIALTY PHARMACY (OUTPATIENT)
Dept: PHARMACY | Facility: CLINIC | Age: 65
End: 2025-02-07

## 2025-02-07 NOTE — PROGRESS NOTES
"[facility] Clinical Note  Patient Reassessment     Introduction  Nargis Gill is a 65 y.o. female who is on the specialty pharmacy service for management of: Dermatology Core.      [facility] supplied medication: risankizumab-rzaa (Skyrizi) 150 mg/mL pen injector pen  Inject 150 mg (1 pen) under the skin every 12 weeks. Start 12 weeks after completion of your first two injections.     Duration of therapy: Maintenance    The most recent encounter visit with the referring prescriber Trina Ridley MD on 02/28/24 was reviewed.  Pharmacy will continue to collaborate in the care of this patient with the referring prescriber.    Discussion  Nargis was contacted on 2/7/2025 at 2:01 PM for a pharmacy visit with encounter number [id number] from:   [facility]  [address] [place] [id number]  Dept: [phone]  Dept Fax: [phone]  Nargis consented to a/an Telephone visit, which was performed.    Efficacy  Patient has developed new symptoms of condition: No  Patient/caregiver feels medication is affecting the disease state: Patient reports that the medication has helped her \"tremendously.\" Reports 100% improvement in symptoms. Denies any recent flares. Uses topical agents rarely but has used in the past month for a patch on elbow.    Goals  Provided education on goals and possible outcomes of therapy:  Adherence with therapy  Timely completion of appropriate labs  Timely and appropriate follow up with provider  Identify and address medication interactions with presciption medications, OTC medications and supplements  Optimize or maintain quality of life  Dermatology: Prevent or reduce disease flares  Reduce use of topical agents (corticosteroids or other \"prn\" agents)  Reduction of plaque size, thickness and body surface area (PSO)  Patient has documented target(s) for goals of therapy: Yes  Patient status for goal(s): On track    Tolerance  Patient has " experienced side effects from this medication: No  Changes to current therapy regimen: No    The follow-up timeline was discussed. Every person responds to and reacts to therapy differently. Patient should be assessed for efficacy and tolerability in approximately: 6 months    Adherence  Patient Information  Informant: Self (Patient)  Demonstrates Understanding of Importance of Adherence: Yes  Does the patient have any barriers to self-administration (including physical and mental?): No  Support Network for Adherence: Healthcare Provider  Adherence Tools Used: Medication list  Medication Information  Medication: risankizumab-rzaa (Skyrizi)  Patient Reported Missed Doses in the Last 4 Weeks: 0  Estimated Medication Adherence Level: Good  Adherence Estimation Source: Claims history  Barriers to Adherence: No Problems identified   The importance of adherence was discussed and patient/caregiver was advised to take the medication as prescribed by their provider. Encouraged patient/caregiver to call physician's office or specialty pharmacy if they have a question regarding a missed dose.    General Assessment  Changes to home medications, OTCs or supplements: No  Current Outpatient Medications   Medication Sig Dispense Refill    co-enzyme Q-10 30 mg capsule Take 1 capsule (30 mg) by mouth once daily.      evolocumab (Repatha SureClick) 140 mg/mL injection Inject 1 mL (140 mg) under the skin every 14 (fourteen) days. 6 each 3    multivitamin tablet Take by mouth.      risankizumab-rzaa (Skyrizi) 150 mg/mL pen injector pen Inject 150 mg (1 pen) under the skin every 12 weeks. Start 12 weeks after completion of your first two injections. 1 mL 3    rosuvastatin (Crestor) 20 mg tablet Take 1 tablet (20 mg) by mouth once daily. 30 tablet 11    triamcinolone (Kenalog) 0.1 % ointment PLEASE SEE ATTACHED FOR DETAILED DIRECTIONS      Twinrix, PF, 720 DIAMOND unit- 20 mcg/mL syringe        No current facility-administered medications  for this visit.     Reported new allergies: No  Reported new medical conditions: No  Additional monitoring reviewed: N/A  Is laboratory follow up needed? No    Advised to contact the pharmacy if there are any changes to the patient's medication list, including prescriptions, OTC medications, herbal products, or supplements.    Impression/Plan  This patient has not been identified as high risk due to Lack of high risk qualifiers.  The following action was taken: N/A.    QOL/Patient Satisfaction  Rate your quality of life on scale of 1-10: 8  Rate your satisfaction with  Specialty Pharmacy on scale of 1-10: 10 - Completely satisfied    Provided contact information ([phone]) for [facility] and reviewed dispensing process, refill timeline and patient management follow up. Confirmed understanding of education conducted during assessment. All questions and concerns were addressed and patient/caregiver was encouraged to reach out for additional questions or concerns.    Based on the patient's diagnosis, medication list, progress towards goals, adherence, tolerance, and medication list, medication remains appropriate: Therapy remains appropriate (I attest)    Ladi Zepeda, NithinD

## 2025-02-19 ENCOUNTER — OFFICE VISIT (OUTPATIENT)
Dept: CARDIOLOGY | Facility: CLINIC | Age: 65
End: 2025-02-19
Payer: COMMERCIAL

## 2025-02-19 VITALS
BODY MASS INDEX: 29.38 KG/M2 | HEART RATE: 69 BPM | WEIGHT: 182 LBS | SYSTOLIC BLOOD PRESSURE: 130 MMHG | DIASTOLIC BLOOD PRESSURE: 79 MMHG

## 2025-02-19 DIAGNOSIS — R93.1 AGATSTON CORONARY ARTERY CALCIUM SCORE GREATER THAN 400: ICD-10-CM

## 2025-02-19 DIAGNOSIS — E78.01 FAMILIAL HYPERCHOLESTEREMIA: ICD-10-CM

## 2025-02-19 DIAGNOSIS — I25.10 CORONARY ARTERY DISEASE INVOLVING NATIVE CORONARY ARTERY OF NATIVE HEART WITHOUT ANGINA PECTORIS: Primary | ICD-10-CM

## 2025-02-19 LAB
ATRIAL RATE: 50 BPM
Q ONSET: 231 MS
QRS COUNT: 10 BEATS
QRS DURATION: 78 MS
QT INTERVAL: 398 MS
QTC CALCULATION(BAZETT): 413 MS
QTC FREDERICIA: 408 MS
R AXIS: 63 DEGREES
T AXIS: 91 DEGREES
T OFFSET: 430 MS
VENTRICULAR RATE: 65 BPM

## 2025-02-19 PROCEDURE — 99214 OFFICE O/P EST MOD 30 MIN: CPT | Performed by: INTERNAL MEDICINE

## 2025-02-19 PROCEDURE — 93005 ELECTROCARDIOGRAM TRACING: CPT | Performed by: INTERNAL MEDICINE

## 2025-02-19 PROCEDURE — 1036F TOBACCO NON-USER: CPT | Performed by: INTERNAL MEDICINE

## 2025-02-19 RX ORDER — ASPIRIN 81 MG/1
81 TABLET ORAL DAILY
COMMUNITY

## 2025-02-19 RX ORDER — ROSUVASTATIN CALCIUM 20 MG/1
20 TABLET, COATED ORAL DAILY
Qty: 90 TABLET | Refills: 3 | Status: SHIPPED | OUTPATIENT
Start: 2025-02-19 | End: 2026-02-19

## 2025-02-19 RX ORDER — EVOLOCUMAB 140 MG/ML
140 INJECTION, SOLUTION SUBCUTANEOUS
Qty: 6 EACH | Refills: 3 | Status: SHIPPED | OUTPATIENT
Start: 2025-02-19

## 2025-02-19 NOTE — PROGRESS NOTES
Chief Complaint:   Coronary Artery Disease     History of Present Illness     Nargis Gill is a 65 y.o. female presenting with for follow-up of preclinical coronary artery disease detected by coronary artery calcium scoring (HTC=855 on 12/9/23).   The patient is tolerating guideline-directed medical therapy with Repatha and statin medication and is compliant.  The patient exercises regularly and follows a heart healthy diet.  The patient has been well since their last office appointment and is not having any anginal symptoms or dyspnea on exertion.      Review of Systems  All pertinent systems have been reviewed and are negative except for what is stated in the history of present illness.    All other systems have been reviewed and are negative and noncontributory to this patient's current ailments.  .       Previous History     Past Medical History:  She has a past medical history of Agatston coronary artery calcium score greater than 400 (12/19/2023), Carcinoma in situ of cervix, unspecified, Coronary artery disease involving native coronary artery of native heart without angina pectoris (12/19/2023), Familial hypercholesteremia (12/19/2023), Moderate cervical dysplasia (12/16/2014), Other specified noninflammatory disorders of vagina, Personal history of other diseases of the circulatory system, Personal history of other diseases of the female genital tract, Personal history of other diseases of the female genital tract, and Personal history of other infectious and parasitic diseases.    Past Surgical History:  She has a past surgical history that includes Cataract extraction (03/25/2014); Other surgical history (10/14/2021); Other surgical history (10/14/2021); Other surgical history (05/08/2014); and Colonoscopy.      Social History:  She reports that she has never smoked. She has never used smokeless tobacco. She reports that she does not currently use alcohol. She reports that she does not use  drugs.    Family History:  Family History   Problem Relation Name Age of Onset    Kidney cancer Mother Gena Winkler     Cancer Mother Gena Winkler     Other (ANXIETY) Father DIVINE WINKLER     Heart disease Father DIVINE WINKLER         Allergies:  Patient has no known allergies.    Outpatient Medications:  Current Outpatient Medications   Medication Instructions    co-enzyme Q-10 30 mg, Daily    multivitamin tablet Take by mouth.    Repatha SureClick 140 mg, subcutaneous, Every 14 days    risankizumab-rzaa (Skyrizi) 150 mg/mL pen injector pen Inject 150 mg (1 pen) under the skin every 12 weeks. Start 12 weeks after completion of your first two injections.    rosuvastatin (CRESTOR) 20 mg, oral, Daily    triamcinolone (Kenalog) 0.1 % ointment PLEASE SEE ATTACHED FOR DETAILED DIRECTIONS    Twinrix, PF, Dami DIAMOND unit- 20 mcg/mL syringe        Physical Examination   Vitals:  Visit Vitals  /79 (BP Location: Right arm, Patient Position: Sitting, BP Cuff Size: Adult)   Pulse 69   Wt 82.6 kg (182 lb)   LMP  (LMP Unknown)   BMI 29.38 kg/m²   OB Status Postmenopausal   Smoking Status Never   BSA 1.96 m²    Physical Exam  Vitals reviewed.   Constitutional:       General: She is not in acute distress.     Appearance: Normal appearance.   HENT:      Head: Normocephalic and atraumatic.      Nose: Nose normal.   Eyes:      Conjunctiva/sclera: Conjunctivae normal.   Cardiovascular:      Rate and Rhythm: Normal rate and regular rhythm.      Pulses: Normal pulses.      Heart sounds: No murmur heard.  Pulmonary:      Effort: Pulmonary effort is normal. No respiratory distress.      Breath sounds: Normal breath sounds. No wheezing, rhonchi or rales.   Abdominal:      General: Bowel sounds are normal. There is no distension.      Palpations: Abdomen is soft.      Tenderness: There is no abdominal tenderness.   Musculoskeletal:         General: No swelling.      Right lower leg: No edema.      Left lower leg: No edema.   Skin:     General:  Skin is warm and dry.      Capillary Refill: Capillary refill takes less than 2 seconds.   Neurological:      General: No focal deficit present.      Mental Status: She is alert.   Psychiatric:         Mood and Affect: Mood normal.             Labs/Imaging/Cardiac Studies   I have personally reviewed the patient's available lab work, primary care appointment notes, pertinent imaging studies, and cardiac studies and have discussed them and my independent interpretation of those results with the patient and caregiver at this appointment.  All pertinent recent Emergency Department evaluations and Hospital admissions were also reviewed in detail with the patient and caregiver.    Reviewed ECG and Labs    Echo:  No echocardiogram results found for the past 12 months       Assessment and Recommendations     Assessment/Plan       1. Coronary artery disease involving native coronary artery of native heart without angina pectoris (Primary)  The patient's CAD, as detailed in the HPI, has been clinically stable, without any anginal symptoms or dyspnea.  The patient will continue treatment with guideline-directed medical therapy with ASA (resume) and statin medications and was advised regular exercise and a heart healthy diet.        - ECG 12 lead (Clinic Performed)    2. Familial hypercholesteremia  The patient's lipids are well controlled on chronic rosuvastatin therapy and Repatha they are meeting their goal LDL cholesterol per the ACC/AHA guidelines.               Johny Cifuentes MD    Exclusive of any other services or procedures performed, I, Johny Cifuentes MD , spent 30 minutes in duration for this visit today.  This time consisted of chart review, obtaining history, and/or performing the exam as documented above as well as documenting the clinical information for the encounter in the electronic record, discussing treatment options, plans, and/or goals with patient, family, and/or caregiver, refilling medications, updating  the electronic record, ordering medicines, lab work, imaging, referrals, and/or procedures as documented above and communicating with other [facility] professionals. I have discussed the results of laboratory, radiology, and cardiology studies with the patient and their family/caregiver.

## 2025-04-01 ENCOUNTER — APPOINTMENT (OUTPATIENT)
Dept: CARDIOLOGY | Facility: CLINIC | Age: 65
End: 2025-04-01
Payer: COMMERCIAL

## 2025-04-02 NOTE — PROGRESS NOTES
Patient ID: Nargis Gill is a 65 y.o. female.  Referring Physician: No referring provider defined for this encounter.  Primary Care Provider: Kristina Souza DO      Subjective    Interval History:    She is going to the bathroom normally and eating and drinking normally and denies abnormal vaginal discharge and spotting/bleeding. Notes ongoing arthritic knee pain and she is trying to lose weight. Notes family stressors and considering retiring.    She denies fever, chills, chest pain, SOB, nausea, vomiting, diarrhea, constipation, dysuria, or any other concerning signs of symptoms.            Nargis Gill is a 64 year old female presenting for cervical dysplasia (CIN3) identified on recent ECC. 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 Pap screenings have been negative since until 12/14/2023 which was c/f HSIL HPV neg and MARIANNE. EMB and colpo 1/12/2024 negative for dysplasia. ECC revealed fragments of CIN3. She denies fever, chills, constipation, diarrhea, vaginal bleeding, abdominal pain, nausea, vomiting, or any other symptoms.    PMH:  Familial hypercholesterolemia  CAD  Psoriasis  Cervical dysplasia     PSH:  Cataract surgery     OBHx:  2 SVDs. She is post-menopausal. Denies ABBY or HRT use.    Social:  Denies alcohol, tobacco, and recreational drug use. She lives alone and cares for her 87 year old father. Her two children are both physicians and are a good source of support. She works as a private practice optometrist.    FamHx:  Hx of renal cancer in mother. Otherwise negative for a history of breast, ovarian, uterine, colon, pancreatic, and GI cancer.     Screening:  Cervical cancer: 12/14/2023 c/f HSIL and MARIANNE. HPV negative.  Mammogram:  12/07/2023 negative  Colonoscopy: 2/09/024 1 polyp, path report pending      Review of Systems - Oncology     Objective   BSA: 1.97 meters squared  /83   Pulse 64   Temp 36.6 °C (97.9 °F)   Resp 18   Wt 83.5 kg (184 lb)   LMP  (LMP  Unknown)   SpO2 95%   BMI 29.70 kg/m²      Family History   Problem Relation Name Age of Onset    Kidney cancer Mother Gena Winkler     Cancer Mother Gena Winkler     Other (ANXIETY) Father DIVINE WINKLER     Heart disease Father DIVINE Gill  reports that she has never smoked. She has never used smokeless tobacco.  She  reports that she does not currently use alcohol.  She  reports no history of drug use.    Physical Exam  Constitutional:       General: She is not in acute distress.     Appearance: Normal appearance. She is not toxic-appearing.   HENT:      Head: Normocephalic.      Mouth/Throat:      Mouth: Mucous membranes are moist.      Pharynx: Oropharynx is clear.   Eyes:      Extraocular Movements: Extraocular movements intact.      Conjunctiva/sclera: Conjunctivae normal.      Pupils: Pupils are equal, round, and reactive to light.   Cardiovascular:      Rate and Rhythm: Normal rate and regular rhythm.      Heart sounds: Normal heart sounds. No murmur heard.     No friction rub. No gallop.   Pulmonary:      Effort: Pulmonary effort is normal.      Breath sounds: Normal breath sounds. No wheezing or rhonchi.   Abdominal:      General: Bowel sounds are normal. There is no distension.      Palpations: Abdomen is soft.      Tenderness: There is no abdominal tenderness.   Genitourinary:     Comments: Normal external genitalia no lesions or masses are appreciated  Speculum Exam: Smooth vagina, flush cervix, no discernable os, small site of granulation tissue at apex of vagina. Pap collected     Musculoskeletal:         General: Normal range of motion.      Cervical back: Normal range of motion.   Skin:     General: Skin is warm.   Neurological:      General: No focal deficit present.      Mental Status: She is alert and oriented to person, place, and time.   Psychiatric:         Mood and Affect: Mood normal.         Behavior: Behavior normal.           Performance  Status:  Asymptomatic    Assessment/Plan      Oncology History    No history exists.   Nargis Gill is a 65 y.o.  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     Provider Attestation - Scribe documentation    All medical record entries made by the Scribe were at my direction and personally dictated by me. I have reviewed the chart and agree that the record accurately reflects my personal performance of the history, physical exam, discussion and plan.    Radha Kim MD

## 2025-04-03 ENCOUNTER — OFFICE VISIT (OUTPATIENT)
Dept: GYNECOLOGIC ONCOLOGY | Facility: CLINIC | Age: 65
End: 2025-04-03
Payer: COMMERCIAL

## 2025-04-03 VITALS
SYSTOLIC BLOOD PRESSURE: 122 MMHG | WEIGHT: 184 LBS | HEART RATE: 64 BPM | DIASTOLIC BLOOD PRESSURE: 83 MMHG | TEMPERATURE: 97.9 F | RESPIRATION RATE: 18 BRPM | BODY MASS INDEX: 29.7 KG/M2 | OXYGEN SATURATION: 95 %

## 2025-04-03 DIAGNOSIS — N87.9 CERVICAL DYSPLASIA: Primary | ICD-10-CM

## 2025-04-03 PROCEDURE — 99214 OFFICE O/P EST MOD 30 MIN: CPT | Performed by: STUDENT IN AN ORGANIZED HEALTH CARE EDUCATION/TRAINING PROGRAM

## 2025-04-03 PROCEDURE — 1036F TOBACCO NON-USER: CPT | Performed by: STUDENT IN AN ORGANIZED HEALTH CARE EDUCATION/TRAINING PROGRAM

## 2025-04-03 ASSESSMENT — PAIN SCALES - GENERAL: PAINLEVEL_OUTOF10: 0-NO PAIN

## 2025-04-08 ENCOUNTER — APPOINTMENT (OUTPATIENT)
Dept: RHEUMATOLOGY | Facility: CLINIC | Age: 65
End: 2025-04-08
Payer: COMMERCIAL

## 2025-04-21 ENCOUNTER — TELEPHONE (OUTPATIENT)
Dept: GYNECOLOGIC ONCOLOGY | Facility: HOSPITAL | Age: 65
End: 2025-04-21
Payer: COMMERCIAL

## 2025-04-21 NOTE — TELEPHONE ENCOUNTER
Reason for Conversation  Results    Background   Phoned patient to notify that pap results are negative/-HPV and Dr. Kim recommends follow up in 1 year.  Notified patient she is scheduled to see Dr. Kim 4/9/26.   Patient verbalized her understanding of information given.    Disposition   Information only

## 2025-04-30 ENCOUNTER — SPECIALTY PHARMACY (OUTPATIENT)
Dept: PHARMACY | Facility: CLINIC | Age: 65
End: 2025-04-30

## 2025-04-30 PROCEDURE — RXMED WILLOW AMBULATORY MEDICATION CHARGE

## 2025-05-02 ENCOUNTER — PHARMACY VISIT (OUTPATIENT)
Dept: PHARMACY | Facility: CLINIC | Age: 65
End: 2025-05-02
Payer: COMMERCIAL

## 2025-06-04 ENCOUNTER — TELEPHONE (OUTPATIENT)
Dept: CARDIOLOGY | Facility: CLINIC | Age: 65
End: 2025-06-04
Payer: COMMERCIAL

## 2025-06-04 NOTE — TELEPHONE ENCOUNTER
?Why is  patient on schedule tomorrow?  Seen 2/19/25 stress echo ordered and not done and I dont see that she is getting it done tomorrow.

## 2025-06-05 ENCOUNTER — HOSPITAL ENCOUNTER (OUTPATIENT)
Dept: CARDIOLOGY | Facility: CLINIC | Age: 65
Discharge: HOME | End: 2025-06-05
Payer: COMMERCIAL

## 2025-06-05 ENCOUNTER — APPOINTMENT (OUTPATIENT)
Dept: CARDIOLOGY | Facility: CLINIC | Age: 65
End: 2025-06-05
Payer: COMMERCIAL

## 2025-06-05 VITALS
DIASTOLIC BLOOD PRESSURE: 91 MMHG | BODY MASS INDEX: 29.57 KG/M2 | HEIGHT: 66 IN | HEART RATE: 78 BPM | SYSTOLIC BLOOD PRESSURE: 149 MMHG | WEIGHT: 184 LBS

## 2025-06-05 VITALS
HEIGHT: 66 IN | HEART RATE: 78 BPM | BODY MASS INDEX: 29.57 KG/M2 | WEIGHT: 184 LBS | DIASTOLIC BLOOD PRESSURE: 91 MMHG | SYSTOLIC BLOOD PRESSURE: 149 MMHG

## 2025-06-05 DIAGNOSIS — E78.01 FAMILIAL HYPERCHOLESTEREMIA: Primary | ICD-10-CM

## 2025-06-05 DIAGNOSIS — I25.10 CORONARY ARTERY DISEASE INVOLVING NATIVE CORONARY ARTERY OF NATIVE HEART WITHOUT ANGINA PECTORIS: ICD-10-CM

## 2025-06-05 DIAGNOSIS — R93.1 AGATSTON CORONARY ARTERY CALCIUM SCORE GREATER THAN 400: ICD-10-CM

## 2025-06-05 DIAGNOSIS — E78.01 FAMILIAL HYPERCHOLESTEREMIA: ICD-10-CM

## 2025-06-05 PROCEDURE — 93350 STRESS TTE ONLY: CPT

## 2025-06-05 PROCEDURE — 93018 CV STRESS TEST I&R ONLY: CPT | Performed by: INTERNAL MEDICINE

## 2025-06-05 PROCEDURE — 93016 CV STRESS TEST SUPVJ ONLY: CPT | Performed by: INTERNAL MEDICINE

## 2025-06-05 PROCEDURE — 99212 OFFICE O/P EST SF 10 MIN: CPT | Mod: 25 | Performed by: INTERNAL MEDICINE

## 2025-06-05 PROCEDURE — 99213 OFFICE O/P EST LOW 20 MIN: CPT | Performed by: INTERNAL MEDICINE

## 2025-06-05 PROCEDURE — 3008F BODY MASS INDEX DOCD: CPT | Performed by: INTERNAL MEDICINE

## 2025-06-05 PROCEDURE — 1036F TOBACCO NON-USER: CPT | Performed by: INTERNAL MEDICINE

## 2025-06-05 PROCEDURE — 93350 STRESS TTE ONLY: CPT | Performed by: INTERNAL MEDICINE

## 2025-06-05 NOTE — PROGRESS NOTES
Chief Complaint:   Coronary Artery Disease     History of Present Illness     Nargis Gill is a 65 y.o. female presenting with for follow-up of preclinical coronary artery disease detected by coronary artery calcium scoring (HTS=868 on 12/9/23).   The patient is tolerating guideline-directed medical therapy with Repatha and statin medication and is compliant.  The patient exercises regularly and follows a heart healthy diet.  The patient has been well since their last office appointment in February and is not having any anginal symptoms or dyspnea on exertion.      Review of Systems  All pertinent systems have been reviewed and are negative except for what is stated in the history of present illness.    All other systems have been reviewed and are negative and noncontributory to this patient's current ailments.  .       Previous History     Past Medical History:  She has a past medical history of Agatston coronary artery calcium score greater than 400 (12/19/2023), Carcinoma in situ of cervix, unspecified, Coronary artery disease involving native coronary artery of native heart without angina pectoris (12/19/2023), Familial hypercholesteremia (12/19/2023), Moderate cervical dysplasia (12/16/2014), Other specified noninflammatory disorders of vagina, Personal history of other diseases of the circulatory system, Personal history of other diseases of the female genital tract, Personal history of other diseases of the female genital tract, and Personal history of other infectious and parasitic diseases.    Past Surgical History:  She has a past surgical history that includes Cataract extraction (03/25/2014); Other surgical history (10/14/2021); Other surgical history (10/14/2021); Other surgical history (05/08/2014); and Colonoscopy.      Social History:  She reports that she has never smoked. She has never used smokeless tobacco. She reports that she does not currently use alcohol. She reports that she does not use  "drugs.    Family History:  Family History   Problem Relation Name Age of Onset    Kidney cancer Mother Gena Winkler     Cancer Mother Gena Winkler     Other (ANXIETY) Father DIVINE WINKLER     Heart disease Father DIVINE WINKLER         Allergies:  Patient has no known allergies.    Outpatient Medications:  Current Outpatient Medications   Medication Instructions    aspirin 81 mg, Daily    co-enzyme Q-10 30 mg, Daily    multivitamin tablet Take by mouth.    Repatha SureClick 140 mg, subcutaneous, Every 14 days    risankizumab-rzaa (Skyrizi) 150 mg/mL pen injector pen Inject 150 mg (1 pen) under the skin every 12 weeks. Start 12 weeks after completion of your first two injections.    rosuvastatin (CRESTOR) 20 mg, oral, Daily    triamcinolone (Kenalog) 0.1 % ointment PLEASE SEE ATTACHED FOR DETAILED DIRECTIONS    Twinrix, PF, Dami DIAMOND unit- 20 mcg/mL syringe        Physical Examination   Vitals:  Visit Vitals  BP (!) 149/91 (BP Location: Right arm)   Pulse 78   Ht 1.676 m (5' 6\")   Wt 83.5 kg (184 lb)   LMP  (LMP Unknown)   BMI 29.70 kg/m²   OB Status Postmenopausal   Smoking Status Never   BSA 1.97 m²       Visit Vitals  LMP  (LMP Unknown)   OB Status Postmenopausal   Smoking Status Never    Physical Exam  Vitals reviewed.   Constitutional:       General: She is not in acute distress.     Appearance: Normal appearance.   HENT:      Head: Normocephalic and atraumatic.      Nose: Nose normal.   Eyes:      Conjunctiva/sclera: Conjunctivae normal.   Cardiovascular:      Rate and Rhythm: Normal rate and regular rhythm.      Pulses: Normal pulses.      Heart sounds: No murmur heard.  Pulmonary:      Effort: Pulmonary effort is normal. No respiratory distress.      Breath sounds: Normal breath sounds. No wheezing, rhonchi or rales.   Abdominal:      General: Bowel sounds are normal. There is no distension.      Palpations: Abdomen is soft.      Tenderness: There is no abdominal tenderness.   Musculoskeletal:         General: No " swelling.      Right lower leg: No edema.      Left lower leg: No edema.   Skin:     General: Skin is warm and dry.      Capillary Refill: Capillary refill takes less than 2 seconds.   Neurological:      General: No focal deficit present.      Mental Status: She is alert.   Psychiatric:         Mood and Affect: Mood normal.           Labs/Imaging/Cardiac Studies   I have personally reviewed the patient's available lab work, primary care appointment notes, pertinent imaging studies, and cardiac studies and have discussed them and my independent interpretation of those results with the patient and caregiver at this appointment.  All pertinent recent Emergency Department evaluations and Hospital admissions were also reviewed in detail with the patient and caregiver.    Reviewed Stress echo - normal  Recent OP BP normal    Echo:  No echocardiogram results found for the past 12 months       Assessment and Recommendations     Assessment/Plan       1. Coronary artery disease involving native coronary artery of native heart without angina pectoris (Primary)  The patient's CAD, as detailed in the HPI, has been clinically stable, without any anginal symptoms or dyspnea.  The patient will continue treatment with guideline-directed medical therapy with ASA and statin/Repatha medications and was advised regular exercise and a heart healthy diet.    Normal stress echo.      - ECG 12 lead (Clinic Performed)    2. Familial hypercholesteremia  The patient's lipids are well controlled on chronic rosuvastatin therapy and Repatha they are meeting their goal LDL cholesterol per the ACC/AHA guidelines.       Nargis Gill will return in 1 year for an office visit.           Johny Cifuentes MD    Exclusive of any other services or procedures performed, I, Johny Cifuentes MD , spent 30 minutes in duration for this visit today.  This time consisted of chart review, obtaining history, and/or performing the exam as documented above as well as  documenting the clinical information for the encounter in the electronic record, discussing treatment options, plans, and/or goals with patient, family, and/or caregiver, refilling medications, updating the electronic record, ordering medicines, lab work, imaging, referrals, and/or procedures as documented above and communicating with other [facility] professionals. I have discussed the results of laboratory, radiology, and cardiology studies with the patient and their family/caregiver.

## 2025-07-30 ENCOUNTER — SPECIALTY PHARMACY (OUTPATIENT)
Dept: PHARMACY | Facility: CLINIC | Age: 65
End: 2025-07-30

## 2025-07-30 PROCEDURE — RXMED WILLOW AMBULATORY MEDICATION CHARGE

## 2025-08-02 LAB
IGNF NEG CNTRL BLD: NORMAL
M TB IFN-G BLD-IMP: NEGATIVE
MITOGEN IGNF.SPOT COUNT BLD: NORMAL
QUEST PANEL A SPOT COUNT: 0
QUEST PANEL B SPOT COUNT: 4

## 2025-08-04 ENCOUNTER — PHARMACY VISIT (OUTPATIENT)
Dept: PHARMACY | Facility: CLINIC | Age: 65
End: 2025-08-04
Payer: COMMERCIAL

## 2025-08-13 ENCOUNTER — APPOINTMENT (OUTPATIENT)
Dept: DERMATOLOGY | Facility: CLINIC | Age: 65
End: 2025-08-13
Payer: COMMERCIAL

## 2025-08-13 DIAGNOSIS — L82.1 SEBORRHEIC KERATOSIS: ICD-10-CM

## 2025-08-13 DIAGNOSIS — L40.9 PSORIASIS: ICD-10-CM

## 2025-08-13 DIAGNOSIS — Z79.899 LONG-TERM USE OF HIGH-RISK MEDICATION: Primary | ICD-10-CM

## 2025-08-13 DIAGNOSIS — L73.8 SEBACEOUS HYPERPLASIA OF FACE: ICD-10-CM

## 2025-08-13 PROCEDURE — 1036F TOBACCO NON-USER: CPT | Performed by: DERMATOLOGY

## 2025-08-13 PROCEDURE — 99213 OFFICE O/P EST LOW 20 MIN: CPT | Performed by: DERMATOLOGY

## 2025-08-13 ASSESSMENT — DERMATOLOGY QUALITY OF LIFE (QOL) ASSESSMENT
ARE THERE EXCLUSIONS OR EXCEPTIONS FOR THE QUALITY OF LIFE ASSESSMENT: NO
WHAT SINGLE SKIN CONDITION LISTED BELOW IS THE PATIENT ANSWERING THE QUALITY-OF-LIFE ASSESSMENT QUESTIONS ABOUT: PSORIASIS
RATE HOW BOTHERED YOU ARE BY SYMPTOMS OF YOUR SKIN PROBLEM (EG, ITCHING, STINGING BURNING, HURTING OR SKIN IRRITATION): 0 - NEVER BOTHERED
DATE THE QUALITY-OF-LIFE ASSESSMENT WAS COMPLETED: 67430
WHAT SINGLE SKIN CONDITION LISTED BELOW IS THE PATIENT ANSWERING THE QUALITY-OF-LIFE ASSESSMENT QUESTIONS ABOUT: PSORIASIS
RATE HOW BOTHERED YOU ARE BY EFFECTS OF YOUR SKIN PROBLEMS ON YOUR ACTIVITIES (EG, GOING OUT, ACCOMPLISHING WHAT YOU WANT, WORK ACTIVITIES OR YOUR RELATIONSHIPS WITH OTHERS): 0 - NEVER BOTHERED
RATE HOW EMOTIONALLY BOTHERED YOU ARE BY YOUR SKIN PROBLEM (FOR EXAMPLE, WORRY, EMBARRASSMENT, FRUSTRATION): 0 - NEVER BOTHERED
WHAT SINGLE SKIN CONDITION LISTED BELOW IS THE PATIENT ANSWERING THE QUALITY-OF-LIFE ASSESSMENT QUESTIONS ABOUT: PSORIASIS
RATE HOW BOTHERED YOU ARE BY SYMPTOMS OF YOUR SKIN PROBLEM (EG, ITCHING, STINGING BURNING, HURTING OR SKIN IRRITATION): 0 - NEVER BOTHERED
RATE HOW BOTHERED YOU ARE BY SYMPTOMS OF YOUR SKIN PROBLEM (EG, ITCHING, STINGING BURNING, HURTING OR SKIN IRRITATION): 0 - NEVER BOTHERED
RATE HOW BOTHERED YOU ARE BY EFFECTS OF YOUR SKIN PROBLEMS ON YOUR ACTIVITIES (EG, GOING OUT, ACCOMPLISHING WHAT YOU WANT, WORK ACTIVITIES OR YOUR RELATIONSHIPS WITH OTHERS): 0 - NEVER BOTHERED
DATE THE QUALITY-OF-LIFE ASSESSMENT WAS COMPLETED: 67430
WHAT SINGLE SKIN CONDITION LISTED BELOW IS THE PATIENT ANSWERING THE QUALITY-OF-LIFE ASSESSMENT QUESTIONS ABOUT: PSORIASIS
RATE HOW BOTHERED YOU ARE BY EFFECTS OF YOUR SKIN PROBLEMS ON YOUR ACTIVITIES (EG, GOING OUT, ACCOMPLISHING WHAT YOU WANT, WORK ACTIVITIES OR YOUR RELATIONSHIPS WITH OTHERS): 0 - NEVER BOTHERED
RATE HOW EMOTIONALLY BOTHERED YOU ARE BY YOUR SKIN PROBLEM (FOR EXAMPLE, WORRY, EMBARRASSMENT, FRUSTRATION): 0 - NEVER BOTHERED
RATE HOW EMOTIONALLY BOTHERED YOU ARE BY YOUR SKIN PROBLEM (FOR EXAMPLE, WORRY, EMBARRASSMENT, FRUSTRATION): 0 - NEVER BOTHERED

## 2025-08-13 ASSESSMENT — ITCH NUMERIC RATING SCALE
HOW SEVERE IS YOUR ITCHING?: 0
HOW SEVERE IS YOUR ITCHING?: 0

## 2025-08-13 ASSESSMENT — DERMATOLOGY PATIENT ASSESSMENT
HAVE YOU HAD OR DO YOU HAVE VASCULAR DISEASE: NO
DO YOU HAVE ANY NEW OR CHANGING LESIONS: NO
DO YOU USE SUNSCREEN: OCCASIONALLY
ARE YOU TRYING TO GET PREGNANT: NO
DO YOU USE A TANNING BED: NO
ARE YOU AN ORGAN TRANSPLANT RECIPIENT: NO
ARE YOU ON BIRTH CONTROL: NO
HAVE YOU HAD OR DO YOU HAVE A STAPH INFECTION: NO
DO YOU HAVE IRREGULAR MENSTRUAL CYCLES: NO

## 2025-08-13 ASSESSMENT — PATIENT GLOBAL ASSESSMENT (PGA)
PATIENT GLOBAL ASSESSMENT: PATIENT GLOBAL ASSESSMENT:  1 - CLEAR
WHAT IS THE PGA: PATIENT GLOBAL ASSESSMENT:  1 - CLEAR
PATIENT GLOBAL ASSESSMENT: PATIENT GLOBAL ASSESSMENT:  1 - CLEAR

## 2025-08-13 ASSESSMENT — PHYSICIAN GLOBAL ASSESSMENT (PGA): WHAT IS THE PGA: PHYSICIAN GLOBAL ASSESSMENT:  0 - CLEAR

## 2025-08-13 ASSESSMENT — BODY SURFACE AREA (BSA): WHAT IS THE BSA PERCENTAGE: < 3% BODY SURFACE AREA INVOLVED

## 2025-08-28 ENCOUNTER — SPECIALTY PHARMACY (OUTPATIENT)
Dept: PHARMACY | Facility: CLINIC | Age: 65
End: 2025-08-28

## 2025-12-03 ENCOUNTER — APPOINTMENT (OUTPATIENT)
Dept: RHEUMATOLOGY | Facility: CLINIC | Age: 65
End: 2025-12-03
Payer: COMMERCIAL

## 2026-04-09 ENCOUNTER — APPOINTMENT (OUTPATIENT)
Dept: GYNECOLOGIC ONCOLOGY | Facility: CLINIC | Age: 66
End: 2026-04-09
Payer: COMMERCIAL

## (undated) DEVICE — NEEDLE, SPINAL, 20 G X 3.5 IN, YELLOW HUB

## (undated) DEVICE — Device

## (undated) DEVICE — PAD, SANITARY, OBSTETRICAL, W/ADHESIVE STRIP, WING, 11 IN, NS

## (undated) DEVICE — PREP TRAY, SKIN, DRY, W/GLOVES

## (undated) DEVICE — SUTURE, VICRYL PLUS 2-0, CT-1, 27IN, UNDYED

## (undated) DEVICE — PAD, GROUNDING, ELECTROSURGICAL, W/9 FT CABLE, POLYHESIVE II, ADULT, LF

## (undated) DEVICE — DRESSING, NON-ADHERENT, TELFA, OUCHLESS, 3 X 8 IN, STERILE

## (undated) DEVICE — COUNTER, NEEDLE, FOAM BLOCK, W/MAGNET, W/BLADE GUARD, 10 COUNT, RED, LF

## (undated) DEVICE — GLOVE, SURGICAL, PROTEXIS PI , 6.0, PF, LF